# Patient Record
Sex: MALE | Race: BLACK OR AFRICAN AMERICAN | Employment: UNEMPLOYED
[De-identification: names, ages, dates, MRNs, and addresses within clinical notes are randomized per-mention and may not be internally consistent; named-entity substitution may affect disease eponyms.]

---
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
## 2021-02-19 ENCOUNTER — OFFICE VISIT (OUTPATIENT)
Dept: PULMONOLOGY | Age: 21
End: 2021-02-19
Payer: COMMERCIAL

## 2021-02-19 VITALS
HEART RATE: 80 BPM | SYSTOLIC BLOOD PRESSURE: 140 MMHG | WEIGHT: 315 LBS | OXYGEN SATURATION: 99 % | TEMPERATURE: 97.3 F | DIASTOLIC BLOOD PRESSURE: 79 MMHG | RESPIRATION RATE: 18 BRPM | BODY MASS INDEX: 50.62 KG/M2 | HEIGHT: 66 IN

## 2021-02-19 DIAGNOSIS — J45.20 MILD INTERMITTENT ASTHMA WITHOUT COMPLICATION: ICD-10-CM

## 2021-02-19 DIAGNOSIS — G47.419 NARCOLEPSY WITHOUT CATAPLEXY: ICD-10-CM

## 2021-02-19 DIAGNOSIS — J30.9 ALLERGIC RHINITIS, UNSPECIFIED SEASONALITY, UNSPECIFIED TRIGGER: ICD-10-CM

## 2021-02-19 DIAGNOSIS — G47.419 PRIMARY NARCOLEPSY WITHOUT CATAPLEXY: Primary | ICD-10-CM

## 2021-02-19 PROCEDURE — G8417 CALC BMI ABV UP PARAM F/U: HCPCS | Performed by: INTERNAL MEDICINE

## 2021-02-19 PROCEDURE — G8427 DOCREV CUR MEDS BY ELIG CLIN: HCPCS | Performed by: INTERNAL MEDICINE

## 2021-02-19 PROCEDURE — 99214 OFFICE O/P EST MOD 30 MIN: CPT | Performed by: INTERNAL MEDICINE

## 2021-02-19 PROCEDURE — 1036F TOBACCO NON-USER: CPT | Performed by: INTERNAL MEDICINE

## 2021-02-19 PROCEDURE — G8484 FLU IMMUNIZE NO ADMIN: HCPCS | Performed by: INTERNAL MEDICINE

## 2021-02-19 RX ORDER — MONTELUKAST SODIUM 10 MG/1
TABLET ORAL
Qty: 90 TABLET | Refills: 3 | Status: SHIPPED | OUTPATIENT
Start: 2021-02-19 | End: 2022-04-27 | Stop reason: SDUPTHER

## 2021-02-19 RX ORDER — SODIUM OXYBATE 0.5 G/ML
4.5 SOLUTION ORAL 2 TIMES DAILY
Qty: 1 BOTTLE | Refills: 5 | Status: SHIPPED | OUTPATIENT
Start: 2021-02-19 | End: 2021-02-21 | Stop reason: SDUPTHER

## 2021-02-19 ASSESSMENT — SLEEP AND FATIGUE QUESTIONNAIRES
ESS TOTAL SCORE: 8
HOW LIKELY ARE YOU TO NOD OFF OR FALL ASLEEP WHILE SITTING AND TALKING TO SOMEONE: 2
HOW LIKELY ARE YOU TO NOD OFF OR FALL ASLEEP WHILE WATCHING TV: 2
HOW LIKELY ARE YOU TO NOD OFF OR FALL ASLEEP WHILE SITTING AND READING: 0
HOW LIKELY ARE YOU TO NOD OFF OR FALL ASLEEP IN A CAR, WHILE STOPPED FOR A FEW MINUTES IN TRAFFIC: 0

## 2021-02-19 NOTE — PROGRESS NOTES
0.6, sleep efficiency index was 89.7%, sleep latency was 1 minute and REM latency was 2.5 minutes there were 3.'s of REM observed. Sleep study did show PLMD with index of around 32 and he was not taking Xyrem at that time. Sleep questionnaire on 11/06/2020  Snores at night, Does not wakes himself snoring. Has no witnessed apnea. Does not wakes up with choking and gasping sensation. Positive dry mouth upon awakening. Positive fatigue and tiredness during the day. No history of sleep apnea. Goes to sleep at 8- 9 pm, wakes up 6-7 am. It takes 5-10 minutes to fall asleep. Wakes up 0-1 times at night to go to bathroom. Takes 2 nap during the day ( 30 to 60 minutes). No headache in am. No car wrecks or near wrecks because of the sleepiness. Positive forgetfulness or decreased concentration. No nasal congestion or obstruction at night. No significant caffienated drinks or alcohol. No leg jerks during sleep. No restless feelings in legs at night. No numbness or burning in leg or feet. No leg aches or cramps . No loss of muscle strength when angry or laugh. No hallucination when dozing off or waking up from sleep. No paralysis upon awakening from sleep or going to sleep. No teeth grinding, occasional nightmares, no sleep walking. No night time panic attacks. Initial history and evaluation on 07/31/2010  He has been followed up by Dr. 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   Accompanied by his mother  Apparently he had narcolepsy for long time according to available history and according to mother he had cataplectic symptoms for short time long time ago and he had not had those symptoms for many many years now, he was on Nuvigil to 50 mg once a day he was still having symptoms with tiredness fatigue excessive daytime sleepiness falling asleep during the daytime multiple times and in last November he was started on Xyrem and supposed to take Xyrem 4.5 g twice at night. He did well since he was started on Xyrem he is more awake during the daytime able to function better but sometimes he does not take Xyrem twice at night and sometimes he forgets. He does feel tired and fatigued during the daytime he does go to sleep easy he take multiple naps during the daytime he dose of easy during the daytime. Since his goals are closed because of pandemic he goes to sleep around 11 PM and wake up 530 to 6:30 in the morning. He take 4 of 5 naps each 30 to 60 minutes during the daytime according to patient he take Xyrem first dose around 8 30-9 and 4 hours later he supposed to take Xyrem which she does not always take it these days. He denies any cataplectic symptoms, in the past many years ago he had sleep paralysis and hallucination but he does not have it for many years anymore. He does have snoring, sometimes witnessed apnea by mother, unrefreshed sleep, multiple awakening at night, decreased concentration during the daytime. According to mother he had sleep study done in 2013 and at that time he was told that he does not have sleep apnea since then he had gained considerable amount of weight. He had history of mild intermittent asthma he was supposed to be on Qvar in the past he had not taking Qvar for more than a year and does not take albuterol for more than a year he claimed that he get some time weather changes and summer season but he does not have asthma exacerbation or asthma symptoms for some time. He is supposed to take Singulair but he does not take Singulair for some time  He also have history of allergic rhinitis for nasal allergies he was prescribed Zyrtec lately he had not started Zyrtec yet.        Sleep Medicine 2/19/2021 11/6/2020 3/7/2018 3/7/2018   Sitting and reading 0 2 1 -   Watching TV 2 2 2 -   Sitting, inactive in a public place (e.g. a theatre or a meeting) 0 3 0 -   As a passenger in a car for an hour without a break 2 3 2 -   Lying down to rest in the afternoon when circumstances permit 0 0 2 -   Sitting and talking to someone 2 2 0 -   Sitting quietly after a lunch without alcohol 2 3 1 -   In a car, while stopped for a few minutes in traffic 0 3 2 -   Total score 8 18 10 -   Neck circumference - - 16 16           Past Medical History:        Diagnosis Date    Allergic rhinitis     Asthma     GERD (gastroesophageal reflux disease)     Obesity (BMI 30-39.9) 6/9/2020    Unspecified sleep apnea        Past Surgical History:        Procedure Laterality Date    HERNIA REPAIR      SINUS SURGERY  2009    TONSILLECTOMY AND ADENOIDECTOMY  2009    WISDOM TOOTH EXTRACTION  12/14/2018       Allergies: Allergies   Allergen Reactions    Eggs [Egg White]     Milk-Related Compounds     Other      mold    Penicillins          Home Meds:   Outpatient Encounter Medications as of 2/19/2021   Medication Sig Dispense Refill    albuterol sulfate HFA (PROAIR HFA) 108 (90 Base) MCG/ACT inhaler Inhale 2 puffs into the lungs every 6 hours as needed for Wheezing 1 Inhaler 5    montelukast (SINGULAIR) 10 MG tablet take 1 tablet every morning 90 tablet 1    Armodafinil 250 MG TABS   0    Respiratory Therapy Supplies (VORTEX HOLDING CHAMBER/MASK) GADIEL 1 Device by Does not apply route daily as needed. 1 Device 0    sodium oxybate (XYREM) 500 MG/ML SOLN solution Take 7.5 mLs by mouth 2 times daily for 30 days. 1 Bottle 1    beclomethasone (QVAR) 80 MCG/ACT inhaler Inhale 2 puffs into the lungs 2 times daily (Patient not taking: Reported on 7/31/2020) 1 Inhaler 5     No facility-administered encounter medications on file as of 2/19/2021. Social History:   TOBACCO:   reports that he has quit smoking. He has never used smokeless tobacco.  ETOH:   reports no history of alcohol use.   OCCUPATION:      Family History:       Problem Relation Age of Onset    Asthma Other        Immunizations: Immunization History   Administered Date(s) Administered    HPV Quadrivalent (Gardasil) 08/01/2013    Hepatitis A Ped/Adol (Havrix, Vaqta) 07/31/2012    Influenza 01/08/2013    Influenza Nasal 10/16/2014, 11/19/2015    Influenza Whole 10/29/2007, 01/19/2010    Influenza, Quadv, IM, PF (6 mo and older Fluzone, Flulaval, Fluarix, and 3 yrs and older Afluria) 01/10/2019, 10/31/2019    Meningococcal B, Recombinant [place]amm[place]) 03/06/2019    Meningococcal MCV4P (Menactra) 08/01/2013    Tdap (Boostrix, Adacel) 08/01/2013         REVIEW OF SYSTEMS:  CONSTITUTIONAL: Positive for fatigue negative for  fevers, chills, sweats, anorexia, and weight loss  EYES:  negative for  double vision, blurred vision, dry eyes, eye discharge, visual disturbance, redness, and icterus  HEENT:  negative for  hearing loss, tinnitus, ear drainage, earaches, nasal congestion, epistaxis, sore throat, hoarseness, voice change, and postnasal drip  RESPIRATORY:  negative for  dry cough, cough with sputum, dyspnea, wheezing, hemoptysis, chest pain, and pleuritic pain  CARDIOVASCULAR:  negative for  chest pain, dyspnea, palpitations, orthopnea, PND, exertional chest pressure/discomfort, fatigue, edema, syncope  GASTROINTESTINAL:  negative for nausea, vomiting, diarrhea, constipation, abdominal pain, abdominal mass, abdominal distention, jaundice, dysphagia, reflux, odynophagia, hematemesis, and hemtochezia  GENITOURINARY:  negative for frequency, dysuria, nocturia, and hematuria  HEMATOLOGIC/LYMPHATIC:  negative for easy bruising, bleeding, lymphadenopathy, and petechiae  ALLERGIC/IMMUNOLOGIC:  negative for recurrent infections, urticaria, hay fever, angioedema, anaphylaxis, and drug reactions  ENDOCRINE:  negative for heat intolerance, cold intolerance, tremor, and weight changes  MUSCULOSKELETAL:  negative for  myalgias, arthralgias, joint swelling, stiff joints, and muscle weakness  NEUROLOGICAL:  negative for headaches, dizziness, seizures, memory problems, speech problems, visual disturbance, gait problems, tremor, dysphagia, weakness, numbness, syncope, and tingling  BEHAVIOR/PSYCH:  negative for decreased sleep, decreased energy level, increased energy level, depressed mood, and anxiety          Physical Exam:    Vitals: BP (!) 140/79   Pulse 80   Temp 97.3 °F (36.3 °C)   Resp 18   Ht 5' 6\" (1.676 m)   Wt (!) 348 lb (157.9 kg)   SpO2 99%   BMI 56.17 kg/m²   Last 3 weights: Wt Readings from Last 3 Encounters:   02/19/21 (!) 348 lb (157.9 kg) (>99 %, Z= 3.49)*   11/06/20 (!) 348 lb (157.9 kg) (>99 %, Z= 3.47)*   08/30/20 (!) 341 lb (154.7 kg) (>99 %, Z= 3.40)*     * Growth percentiles are based on [facility] (Boys, 2-20 Years) data. Body mass index is 56.17 kg/m². Physical Examination:   General appearance - alert, well appearing, and in no distress, overweight and acyanotic, in no respiratory distress  Mental status - alert, oriented to person, place, and time, normal mood, behavior, speech, dress, motor activity, and thought processes  Eyes - pupils equal and reactive, extraocular eye movements intact, sclera anicteric  Ears - right ear normal, left ear normal  Nose - normal and patent, no erythema, discharge or polyps  Mouth - mucous membranes moist, pharynx normal without lesions and very large tongue, thick uvula, small oropharynx, Mallampati 3  Neck - supple, no significant adenopathy, very short and thick neck  Chest - clear to auscultation, no wheezes, rales or rhonchi, symmetric air entry, no tachypnea, retractions or cyanosis bilateral symmetrical chest movement, normal resonance on percussion, air entry is present bilaterally and symmetrical, no expiratory wheezing rhonchi or crackles.   Heart - normal rate, regular rhythm, normal S1, S2, no murmurs, rubs, clicks or gallops  Abdomen - soft, nontender, nondistended, no masses or organomegaly  Neurological - alert, oriented, normal speech, no focal findings or movement Ref Range Status   05/13/2020 15 5 - 41 U/L Final   03/09/2019 13 5 - 41 U/L Final   06/16/2015 16 5 - 41 U/L Final     Total Bilirubin   Date Value Ref Range Status   05/13/2020 0.49 0.3 - 1.2 mg/dL Final   03/09/2019 0.54 0.3 - 1.2 mg/dL Final   06/16/2015 0.64 0.3 - 1.2 mg/dL Final     Alkaline Phosphatase   Date Value Ref Range Status   05/13/2020 88 40 - 129 U/L Final   03/09/2019 100 52 - 171 U/L Final   06/16/2015 227 74 - 390 U/L Final     Amylase: No results found for: AMYLASE  Lipase: No results found for: LIPASE  CARDIAC ENZYMES: No results found for: CKTOTAL, CKMB, CKMBINDEX, TROPONINI  BNP: No results found for: BNP  Lipids:   Cholesterol   Date Value Ref Range Status   03/09/2019 160 <200 mg/dL Final     Comment:        Cholesterol Guidelines:      <200  Desirable   200-240  Borderline      >240  Undesirable          HDL   Date Value Ref Range Status   05/13/2020 41 >40 mg/dL Final     Comment:        HDL Guidelines:    <40     Undesirable   40-59    Borderline    >59     Desirable            INR: No results found for: INR  Thyroid:   TSH   Date Value Ref Range Status   05/13/2020 1.25 0.30 - 5.00 mIU/L Final     Urinalysis:   Color, UA   Date Value Ref Range Status   03/09/2019 YELLOW YELLOW Final     pH, UA   Date Value Ref Range Status   03/09/2019 6.5 5.0 - 8.0 Final     Protein, UA   Date Value Ref Range Status   03/09/2019 NEGATIVE NEGATIVE Final     Specific Gravity, UA   Date Value Ref Range Status   03/09/2019 1.016 1.005 - 1.030 Final     Bilirubin Urine   Date Value Ref Range Status   03/09/2019 NEGATIVE NEGATIVE Final     Nitrite, Urine   Date Value Ref Range Status   03/09/2019 NEGATIVE NEGATIVE Final     Leukocyte Esterase, Urine   Date Value Ref Range Status   03/09/2019 NEGATIVE NEGATIVE Final     Glucose, Ur   Date Value Ref Range Status   03/09/2019 NEGATIVE NEGATIVE Final     Cultures:-  -----------------------------------------------------------------    ABGs: No results found for: PHART, PO2ART, UND7FWT    Pulmonary Functions Testing Results:    No results found for: FEV1, FVC, WMN6KLX, TLC, DLCO    CXR      CT Scans    Sleep study 08/30/2020 08/30/2020 and did not show any significant sleep apnea with total AHI of 0.6, sleep efficiency index was 89.7%, sleep latency was 1 minute and REM latency was 2.5 minutes there were 3.'s of REM observed. Sleep study did show PLMD with index of around 32 and he was not taking Xyrem at that time. Assessment and Plan       ICD-10-CM   1. GALO (obstructive sleep apnea)  G47.33       2. Primary narcolepsy without cataplexy  G47.419   3. Morbid obesity with BMI of 45.0-49.9, adult [facility]  E66.01    Z68.42   4. Mild intermittent asthma without complication  N65.37   5. Allergic rhinitis, unspecified seasonality, unspecified trigger  J30.9         Plan and recommendation:    Continue Xyrem currently 3.375 g.  8 PM and 12  Continue Xyrem at 4.5 g at 8 PM and 12 midnight once he is able to reach to 4.5 g  Advised and warned about the effect of Xyrem during the daytime. Advised strictly about understanding the dose of Xyrem. Prescription for that and return  Discussed with patient and mother about nighttime consolidated sleep. Discussed with him about fixed sleeping time and waking up time. Consolidated nighttime sleep of 8 hours discussed and advised. Avoidance of long nap during the daytime but the naps of 30 minutes advised. Good sleep hygiene and sleep environment discussed with the patient.       Sleep study results which was done on 08/30/2020 seen  Wt loss is recommended and discussed  Follow good sleep hygeine instructions  Given sleep hygeine instructions    Singulair 10 mg once daily to continue prescription written  Albuterol as needed  Observe off Qvar as he is off for many years  Advised to use Claritin as needed    Vaccinations recommended annually in fall  Up to date with vaccinations from [facility] an active lifestyle

## 2021-02-21 RX ORDER — SODIUM OXYBATE 0.5 G/ML
4.5 SOLUTION ORAL 2 TIMES DAILY
Qty: 1 BOTTLE | Refills: 5 | Status: SHIPPED | OUTPATIENT
Start: 2021-02-21 | End: 2022-09-23 | Stop reason: ALTCHOICE

## 2021-02-22 NOTE — PATIENT INSTRUCTIONS
2/22/2021 - faxed over new xyrem script form for us to become patients new pulmonary doctor.  Also faxed over Prescriber enrollment form for Dr. Mignon Juarez.

## 2021-04-26 ENCOUNTER — TELEPHONE (OUTPATIENT)
Dept: PULMONOLOGY | Age: 21
End: 2021-04-26

## 2021-04-26 NOTE — TELEPHONE ENCOUNTER
RECEIVED a PA notice for Zyrem. THis was processed with covermymeds.      Kelly Magallon (Key: [id number])   Xyrem 500MG/ML solution   Form  [employer] Electronic Prior Authorization Form 2017 [employer]   Plan Contact  [phone] phone   Created   5 hours ago   Sent to Plan   less than a minute ago   Plan Response   less than a minute ago   Submit Clinical Questions   Determination   N/A   Message from Plan  Prior Authorization duplicate/approved

## 2021-05-21 ENCOUNTER — OFFICE VISIT (OUTPATIENT)
Dept: PULMONOLOGY | Age: 21
End: 2021-05-21
Payer: COMMERCIAL

## 2021-05-21 VITALS
HEART RATE: 88 BPM | BODY MASS INDEX: 50.62 KG/M2 | OXYGEN SATURATION: 98 % | DIASTOLIC BLOOD PRESSURE: 78 MMHG | TEMPERATURE: 97.1 F | WEIGHT: 315 LBS | SYSTOLIC BLOOD PRESSURE: 123 MMHG | HEIGHT: 66 IN

## 2021-05-21 DIAGNOSIS — J45.20 MILD INTERMITTENT ASTHMA WITHOUT COMPLICATION: ICD-10-CM

## 2021-05-21 DIAGNOSIS — G47.33 OSA (OBSTRUCTIVE SLEEP APNEA): ICD-10-CM

## 2021-05-21 DIAGNOSIS — G47.61 PERIODIC LIMB MOVEMENT DISORDER (PLMD): ICD-10-CM

## 2021-05-21 DIAGNOSIS — G47.419 NARCOLEPSY WITHOUT CATAPLEXY: Primary | ICD-10-CM

## 2021-05-21 DIAGNOSIS — E66.01 MORBID OBESITY WITH BMI OF 45.0-49.9, ADULT (HCC): ICD-10-CM

## 2021-05-21 PROCEDURE — G8417 CALC BMI ABV UP PARAM F/U: HCPCS | Performed by: INTERNAL MEDICINE

## 2021-05-21 PROCEDURE — 99214 OFFICE O/P EST MOD 30 MIN: CPT | Performed by: INTERNAL MEDICINE

## 2021-05-21 PROCEDURE — G8427 DOCREV CUR MEDS BY ELIG CLIN: HCPCS | Performed by: INTERNAL MEDICINE

## 2021-05-21 PROCEDURE — 1036F TOBACCO NON-USER: CPT | Performed by: INTERNAL MEDICINE

## 2021-05-21 ASSESSMENT — SLEEP AND FATIGUE QUESTIONNAIRES
HOW LIKELY ARE YOU TO NOD OFF OR FALL ASLEEP WHEN YOU ARE A PASSENGER IN A CAR FOR AN HOUR WITHOUT A BREAK: 3
HOW LIKELY ARE YOU TO NOD OFF OR FALL ASLEEP WHILE WATCHING TV: 2
HOW LIKELY ARE YOU TO NOD OFF OR FALL ASLEEP WHILE LYING DOWN TO REST IN THE AFTERNOON WHEN CIRCUMSTANCES PERMIT: 3
ESS TOTAL SCORE: 19

## 2021-05-21 NOTE — PROGRESS NOTES
OUTPATIENT PULMONARY PROGRESS NOTE      Patient:  Zafar Ochoa  MRN: [id number]    Consulting Physician: Anil Coppola MD  Reason for Consult: Narcolepsy without cataplexy/mild intermittent asthma  Primacy Care Physician: Anil Coppola MD    HISTORY OF PRESENT ILLNESS:   The patient is a 24 y.o. 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 He has history of noncompliance with Xyrem. When he was seen last time I had a long discussion in presence of mother and was restarted on Xyrem. According to patient and mother apparently he took Xyrem for some time and then stop it again for last 2 months he is not been taking Xyrem. According to patient he thinks that his sleeping habit is better. He graduated from school. According to patient when he is not active and he does not have to do anything he can sleep for 2 to 3 hours otherwise he take naps 15 to 20 minutes 2-3 times every day. He is going to sleep around 10 PM now and waking up around 5 to 6 in the morning. He does not complain of sleep paralysis, no hallucination during sleep or coming of sleep. No cataplexy. No significant restless leg symptoms or movement during sleep. He does not drive. He does not have any problem taking his abdomen denies any side effect from Xyrem but just not been compliant with Xyrem. He had some weight gain according to mother denies any weight loss. He does not have any asthma symptoms last time he took albuterol was long time ago not in any other medication. He has intermittent allergy rhinitis symptoms which is seasonal denies any symptoms currently. Denies shortness of breath cough chest tightness wheezing and no dyspnea on exertion.     Last sleep study was done on 08/30/2020 and did not show any significant sleep apnea with total AHI of 0.6, sleep efficiency index was 89.7%, sleep latency was 1 minute and REM latency was 2.5 minutes there were 3.'s of REM observed. Sleep study did show PLMD with index of around 32 and he was not taking Xyrem at that time. Sleep questionnaire on 05/21/2021  Snores at night, Does not wakes himself snoring. Has no witnessed apnea. Does not wakes up with choking and gasping sensation. Positive dry mouth upon awakening. Positive fatigue and tiredness during the day. No history of sleep apnea. Goes to sleep at 10 pm, wakes up 5-6 am. It takes 5-10 minutes to fall asleep. Wakes up 0-1 times at night to go to bathroom. Takes 2-3 nap during the day. No headache in am. No car wrecks or near wrecks because of the sleepiness. Positive forgetfulness or decreased concentration. No nasal congestion or obstruction at night. No significant caffienated drinks or alcohol. No leg jerks during sleep. No restless feelings in legs at night. No numbness or burning in leg or feet. No leg aches or cramps . No loss of muscle strength when angry or laugh. No hallucination when dozing off or waking up from sleep. No paralysis upon awakening from sleep or going to sleep. No teeth grinding, occasional nightmares, no sleep walking. No night time panic attacks. Initial history and evaluation on 07/31/2010  He has been followed up by Dr. Oscar Vega pediatric pulmonologist for a long time with history of narcolepsy without cataplexy, history of mild intermittent asthma, allergic rhinitis.   Accompanied by his mother  Apparently he had narcolepsy for long time according to available history and according to mother he had cataplectic symptoms for short time long time ago and he had not had those symptoms for many many years now, he was on Nuvigil to 50 mg once a day he was still having symptoms with tiredness fatigue excessive daytime sleepiness falling asleep during the daytime multiple times and in last November he was started on Xyrem and supposed to take Xyrem 4.5 g twice at night. He did well since he was started on Xyrem he is more awake during the daytime able to function better but sometimes he does not take Xyrem twice at night and sometimes he forgets. He does feel tired and fatigued during the daytime he does go to sleep easy he take multiple naps during the daytime he dose of easy during the daytime. Since his goals are closed because of pandemic he goes to sleep around 11 PM and wake up 530 to 6:30 in the morning. He take 4 of 5 naps each 30 to 60 minutes during the daytime according to patient he take Xyrem first dose around 8 30-9 and 4 hours later he supposed to take Xyrem which she does not always take it these days. He denies any cataplectic symptoms, in the past many years ago he had sleep paralysis and hallucination but he does not have it for many years anymore. He does have snoring, sometimes witnessed apnea by mother, unrefreshed sleep, multiple awakening at night, decreased concentration during the daytime. According to mother he had sleep study done in 2013 and at that time he was told that he does not have sleep apnea since then he had gained considerable amount of weight. He had history of mild intermittent asthma he was supposed to be on Qvar in the past he had not taking Qvar for more than a year and does not take albuterol for more than a year he claimed that he get some time weather changes and summer season but he does not have asthma exacerbation or asthma symptoms for some time. He is supposed to take Singulair but he does not take Singulair for some time  He also have history of allergic rhinitis for nasal allergies he was prescribed Zyrtec lately he had not started Zyrtec yet.        Sleep Medicine 5/21/2021 2/19/2021 11/6/2020 3/7/2018 3/7/2018   Sitting and reading 3 0 2 1 -   Watching TV 2 2 2 2 -   Sitting, inactive in a public place (e.g. a theatre or a meeting) 3 0 3 0 -   As a passenger in a car for an hour without a break 3 2 3 2 - Lying down to rest in the afternoon when circumstances permit 3 0 0 2 -   Sitting and talking to someone 2 2 2 0 -   Sitting quietly after a lunch without alcohol 3 2 3 1 -   In a car, while stopped for a few minutes in traffic 0 0 3 2 -   Total score 19 8 18 10 -   Neck circumference - - - 16 16           Past Medical History:        Diagnosis Date    Allergic rhinitis     Asthma     GERD (gastroesophageal reflux disease)     Obesity (BMI 30-39.9) 6/9/2020    Unspecified sleep apnea        Past Surgical History:        Procedure Laterality Date    HERNIA REPAIR      SINUS SURGERY  2009    TONSILLECTOMY AND ADENOIDECTOMY  2009    WISDOM TOOTH EXTRACTION  12/14/2018       Allergies: Allergies   Allergen Reactions    Eggs [Egg White]     Milk-Related Compounds     Other      mold    Penicillins          Home Meds:   Outpatient Encounter Medications as of 5/21/2021   Medication Sig Dispense Refill    metFORMIN (GLUCOPHAGE) 500 MG tablet Take 500 mg by mouth daily      sodium oxybate (XYREM) 500 MG/ML SOLN solution Take 9 mLs by mouth 2 times daily for 30 days. 1 Bottle 5    montelukast (SINGULAIR) 10 MG tablet take 1 tablet every morning 90 tablet 3    albuterol sulfate HFA (PROAIR HFA) 108 (90 Base) MCG/ACT inhaler Inhale 2 puffs into the lungs every 6 hours as needed for Wheezing 1 Inhaler 5    Respiratory Therapy Supplies (VORTEX HOLDING CHAMBER/MASK) GADIEL 1 Device by Does not apply route daily as needed. 1 Device 0    Armodafinil 250 MG TABS   0    [DISCONTINUED] beclomethasone (QVAR) 80 MCG/ACT inhaler Inhale 2 puffs into the lungs 2 times daily 1 Inhaler 5     No facility-administered encounter medications on file as of 5/21/2021. Social History:   TOBACCO:   reports that he has quit smoking. He has never used smokeless tobacco.  ETOH:   reports no history of alcohol use.   OCCUPATION:      Family History:       Problem Relation Age of Onset    Asthma Other        Immunizations: Immunization History   Administered Date(s) Administered    HPV Quadrivalent (Gardasil) 08/01/2013    Hepatitis A Ped/Adol (Havrix, Vaqta) 07/31/2012    Influenza 01/08/2013    Influenza Nasal 10/16/2014, 11/19/2015    Influenza Whole 10/29/2007, 01/19/2010    Influenza, Quadv, IM, PF (6 mo and older Fluzone, Flulaval, Fluarix, and 3 yrs and older Afluria) 01/10/2019, 10/31/2019    Meningococcal B, Recombinant Gali Sergeant) 03/06/2019    Meningococcal MCV4P (Menactra) 08/01/2013    Tdap (Boostrix, Adacel) 08/01/2013         REVIEW OF SYSTEMS:  CONSTITUTIONAL: Denies fatigue negative for  fevers, chills, sweats, anorexia, and weight loss  EYES:  negative for  double vision, blurred vision, dry eyes, eye discharge, visual disturbance, redness, and icterus  HEENT:  negative for  hearing loss, tinnitus, ear drainage, earaches, nasal congestion, epistaxis, sore throat, hoarseness, voice change, and postnasal drip  RESPIRATORY:  negative for  dry cough, cough with sputum, dyspnea, wheezing, hemoptysis, chest pain, and pleuritic pain  CARDIOVASCULAR:  negative for  chest pain, dyspnea, palpitations, orthopnea, PND, exertional chest pressure/discomfort, fatigue, edema, syncope  GASTROINTESTINAL:  negative for nausea, vomiting, diarrhea, constipation, abdominal pain, abdominal mass, abdominal distention, jaundice, dysphagia, reflux, odynophagia, hematemesis, and hemtochezia  GENITOURINARY:  negative for frequency, dysuria, nocturia, and hematuria  HEMATOLOGIC/LYMPHATIC:  negative for easy bruising, bleeding, lymphadenopathy, and petechiae  ALLERGIC/IMMUNOLOGIC:  negative for recurrent infections, urticaria, hay fever, angioedema, anaphylaxis, and drug reactions  ENDOCRINE:  negative for heat intolerance, cold intolerance, tremor, and weight changes  MUSCULOSKELETAL:  negative for  myalgias, arthralgias, joint swelling, stiff joints, and muscle weakness  NEUROLOGICAL:  negative for headaches, dizziness, seizures, memory problems, speech problems, visual disturbance, gait problems, tremor, dysphagia, weakness, numbness, syncope, and tingling  BEHAVIOR/PSYCH:  negative for decreased sleep, decreased energy level, increased energy level, depressed mood, and anxiety          Physical Exam:    Vitals: /78 (Site: Right Lower Arm, Position: Sitting, Cuff Size: Medium Adult)   Pulse 88   Temp 97.1 °F (36.2 °C) (Skin)   Ht 5' 6\" (1.676 m)   Wt (!) 358 lb (162.4 kg)   SpO2 98% Comment: room air at rest  BMI 57.78 kg/m²   Last 3 weights: Wt Readings from Last 3 Encounters:   05/21/21 (!) 358 lb (162.4 kg) (>99 %, Z= 3.60)*   02/19/21 (!) 356 lb (161.5 kg) (>99 %, Z= 3.56)*   11/06/20 (!) 348 lb (157.9 kg) (>99 %, Z= 3.47)*     * Growth percentiles are based on CDC (Boys, 2-20 Years) data. Body mass index is 57.78 kg/m². Physical Examination:   General appearance - alert, well appearing, and in no distress, overweight and acyanotic, in no respiratory distress  Mental status - alert, oriented to person, place, and time, normal mood, behavior, speech, dress, motor activity, and thought processes  Eyes - pupils equal and reactive, extraocular eye movements intact, sclera anicteric  Ears - right ear normal, left ear normal  Nose - normal and patent, no erythema, discharge or polyps  Mouth - mucous membranes moist, pharynx normal without lesions and very large tongue, thick uvula, small oropharynx, Mallampati 3  Neck - supple, no significant adenopathy, very short and thick neck  Chest - clear to auscultation, no wheezes, rales or rhonchi, symmetric air entry, no tachypnea, retractions or cyanosis bilateral symmetrical chest movement, normal resonance on percussion, air entry is present bilaterally and symmetrical, no expiratory wheezing rhonchi or crackles.   Heart - normal rate, regular rhythm, normal S1, S2, no murmurs, rubs, clicks or gallops  Abdomen - soft, nontender, nondistended, no masses or organomegaly  Neurological - alert, oriented, normal speech, no focal findings or movement disorder noted}  Extremities - peripheral pulses normal, no pedal edema, no clubbing or cyanosis  Skin - normal coloration and turgor, no rashes, no suspicious skin lesions noted       LABS:    CBC:   WBC   Date Value Ref Range Status   03/09/2019 7.8 4.5 - 13.5 k/uL Final   02/10/2017 7.8 4.5 - 13.5 k/uL Final     Hemoglobin   Date Value Ref Range Status   03/09/2019 11.9 (L) 13.0 - 17.0 g/dL Final   02/10/2017 12.6 (L) 13.5 - 17.5 g/dL Final     Platelets   Date Value Ref Range Status   03/09/2019 428 138 - 453 k/uL Final   02/10/2017 354 130 - 400 k/uL Final     BMP:   Sodium   Date Value Ref Range Status   05/13/2020 137 135 - 144 mmol/L Final   03/09/2019 138 135 - 144 mmol/L Final   06/16/2015 139 135 - 144 mmol/L Final     Potassium   Date Value Ref Range Status   05/13/2020 4.2 3.7 - 5.3 mmol/L Final   03/09/2019 4.4 3.6 - 4.9 mmol/L Final   06/16/2015 4.7 3.6 - 4.9 mmol/L Final     Chloride   Date Value Ref Range Status   05/13/2020 102 98 - 107 mmol/L Final   03/09/2019 100 98 - 107 mmol/L Final   06/16/2015 100 98 - 107 mmol/L Final     CO2   Date Value Ref Range Status   05/13/2020 24 20 - 31 mmol/L Final   03/09/2019 26 20 - 31 mmol/L Final   06/16/2015 26 20 - 31 mmol/L Final     BUN   Date Value Ref Range Status   05/13/2020 10 6 - 20 mg/dL Final   03/09/2019 8 5 - 18 mg/dL Final   06/16/2015 8 5 - 18 mg/dL Final     CREATININE   Date Value Ref Range Status   05/13/2020 0.77 0.70 - 1.20 mg/dL Final   03/09/2019 0.78 0.70 - 1.20 mg/dL Final   06/16/2015 0.68 0.57 - 0.87 mg/dL Final     Glucose   Date Value Ref Range Status   03/09/2019 92 60 - 100 mg/dL Final   02/10/2017 92 60 - 100 mg/dL Final     Comment:     Performed at [facility] - INPATIENT [address], [facility]   [phone]     06/16/2015 90 60 - 100 mg/dL Final     Hepatic:     Amylase: No results found for: AMYLASE  Lipase:  No results found for: LIPASE  CARDIAC ENZYMES: No results found for: CKTOTAL, CKMB, CKMBINDEX, TROPONINI  BNP: No results found for: BNP  Lipids:       INR: No results found for: INR  Thyroid:   TSH   Date Value Ref Range Status   05/13/2020 1.25 0.30 - 5.00 mIU/L Final     Urinalysis:     Cultures:-  -----------------------------------------------------------------    ABGs: No results found for: PHART, PO2ART, JYF1GVG    Pulmonary Functions Testing Results:    No results found for: FEV1, FVC, CBZ4UAE, TLC, DLCO    CXR      CT Scans    Sleep study 08/30/2020 08/30/2020 and did not show any significant sleep apnea with total AHI of 0.6, sleep efficiency index was 89.7%, sleep latency was 1 minute and REM latency was 2.5 minutes there were 3.'s of REM observed. Sleep study did show PLMD with index of around 32 and he was not taking Xyrem at that time. Assessment and Plan       ICD-10-CM   1. GALO (obstructive sleep apnea)  G47.33       2. Primary narcolepsy without cataplexy  G47.419   3. Morbid obesity with BMI of 45.0-49.9, adult ([facility]  E66.01    Z68.42   4. Mild intermittent asthma without complication  V02.45   5. Allergic rhinitis, unspecified seasonality, unspecified trigger  J30.9         Plan and recommendation:    I would long discussion with patient presence of mother discussed the effect of narcolepsy. Regular sleep-wake cycle sleep onset and sleep awakening time, avoidance of long naps during the daytime and recommend naps of less than 30-minute was during the daytime if needed. Restart Xyrem need to start 2.5 gram at 8 PM and 12 midnight and then increase to 3.375 g after 1 week and then increase to 4.5 g after 1 week. Xyrem to be taken at 8 PM and 12 midnight  Advised and warned about the effect of Xyrem during the daytime. Advised strictly and discussed about understanding the dose of Xyrem.   He had prescription and medication at home does not need new prescription at this time  Discussed with patient and mother about nighttime consolidated sleep. Discussed with him about fixed sleeping time and waking up time. Consolidated nighttime sleep of 8 hours discussed and advised. Avoidance of long nap during the daytime but the naps of 30 minutes advised. Good sleep hygiene and sleep environment discussed with the patient. Wt loss is recommended and discussed  Follow good sleep hygeine instructions  Given sleep hygeine instructions    Singulair 10 mg once daily to continue   Albuterol as needed  He is off Qvar for many years  Advised to use Claritin as needed    Vaccinations recommended annually in fall  Up to date with vaccinations from pulm perspective   Recommend COVID-19 vaccine  Maintain an active lifestyle   Questions answered to pt's/family's satisfaction  Questions answered pertaining to diagnosis and management explained importance of compliance with therapy       RTC 3 months      It was my pleasure to evaluate Aubrey Kwok today. Please call with questions. Please note that this chart was generated using voice recognition Dragon dictation software. Although every effort was made to ensure the accuracy of this automated transcription, some errors in transcription may have occurred. Delvis Lezama MD, MD             5/21/2021, 3:30 PM    Please note that this chart was generated using voice recognition Dragon dictation software. Although every effort was made to ensure the accuracy of this automated transcription, some errors in transcription may have occurred.

## 2021-07-01 ENCOUNTER — HOSPITAL ENCOUNTER (OUTPATIENT)
Age: 21
Setting detail: SPECIMEN
Discharge: HOME | End: 2021-07-01
Payer: COMMERCIAL

## 2021-07-01 LAB
ALBUMIN SERPL-MCNC: 4 G/DL (ref 3.5–5.2)
ALBUMIN/GLOBULIN RATIO: 1.3 (ref 1–2.5)
ALP BLD-CCNC: 96 U/L (ref 40–129)
ALT SERPL-CCNC: 14 U/L (ref 5–41)
ANION GAP SERPL CALCULATED.3IONS-SCNC: 11 MMOL/L (ref 9–17)
AST SERPL-CCNC: 17 U/L
BILIRUB SERPL-MCNC: 0.62 MG/DL (ref 0.3–1.2)
BUN BLDV-MCNC: 8 MG/DL (ref 6–20)
BUN/CREAT BLD: ABNORMAL (ref 9–20)
CALCIUM SERPL-MCNC: 8.8 MG/DL (ref 8.6–10.4)
CHLORIDE BLD-SCNC: 103 MMOL/L (ref 98–107)
CHOLESTEROL, FASTING: 178 MG/DL
CHOLESTEROL/HDL RATIO: 4.8
CO2: 26 MMOL/L (ref 20–31)
CREAT SERPL-MCNC: 0.65 MG/DL (ref 0.7–1.2)
ESTIMATED AVERAGE GLUCOSE: 85 MG/DL
GFR AFRICAN AMERICAN: ABNORMAL ML/MIN
GFR NON-AFRICAN AMERICAN: ABNORMAL ML/MIN
GFR SERPL CREATININE-BSD FRML MDRD: ABNORMAL ML/MIN/{1.73_M2}
GFR SERPL CREATININE-BSD FRML MDRD: ABNORMAL ML/MIN/{1.73_M2}
GLUCOSE BLD-MCNC: 98 MG/DL (ref 70–99)
HBA1C MFR BLD: 4.6 % (ref 4–6)
HDLC SERPL-MCNC: 37 MG/DL
INSULIN COMMENT: NORMAL
INSULIN REFERENCE RANGE:: NORMAL
INSULIN: 56.1 MU/L
LDL CHOLESTEROL: 114 MG/DL (ref 0–130)
POTASSIUM SERPL-SCNC: 4.3 MMOL/L (ref 3.7–5.3)
SODIUM BLD-SCNC: 140 MMOL/L (ref 135–144)
T3 TOTAL: 123 NG/DL (ref 60–181)
T4 TOTAL: 7.4 UG/DL (ref 4.5–10.9)
TOTAL PROTEIN: 7.2 G/DL (ref 6.4–8.3)
TRIGLYCERIDE, FASTING: 134 MG/DL
TSH SERPL DL<=0.05 MIU/L-ACNC: 1.11 MIU/L (ref 0.3–5)
VLDLC SERPL CALC-MCNC: ABNORMAL MG/DL (ref 1–30)

## 2021-10-22 ENCOUNTER — OFFICE VISIT (OUTPATIENT)
Dept: PULMONOLOGY | Age: 21
End: 2021-10-22
Payer: COMMERCIAL

## 2021-10-22 VITALS
HEART RATE: 84 BPM | BODY MASS INDEX: 50.62 KG/M2 | HEIGHT: 66 IN | SYSTOLIC BLOOD PRESSURE: 128 MMHG | WEIGHT: 315 LBS | TEMPERATURE: 97.3 F | OXYGEN SATURATION: 99 % | DIASTOLIC BLOOD PRESSURE: 82 MMHG

## 2021-10-22 DIAGNOSIS — G47.419 NARCOLEPSY WITHOUT CATAPLEXY: Primary | ICD-10-CM

## 2021-10-22 DIAGNOSIS — G47.61 PERIODIC LIMB MOVEMENT DISORDER (PLMD): ICD-10-CM

## 2021-10-22 DIAGNOSIS — E66.01 MORBID OBESITY WITH BMI OF 45.0-49.9, ADULT (HCC): ICD-10-CM

## 2021-10-22 DIAGNOSIS — J45.20 MILD INTERMITTENT ASTHMA WITHOUT COMPLICATION: ICD-10-CM

## 2021-10-22 PROCEDURE — G8417 CALC BMI ABV UP PARAM F/U: HCPCS | Performed by: INTERNAL MEDICINE

## 2021-10-22 PROCEDURE — G8484 FLU IMMUNIZE NO ADMIN: HCPCS | Performed by: INTERNAL MEDICINE

## 2021-10-22 PROCEDURE — G8427 DOCREV CUR MEDS BY ELIG CLIN: HCPCS | Performed by: INTERNAL MEDICINE

## 2021-10-22 PROCEDURE — 1036F TOBACCO NON-USER: CPT | Performed by: INTERNAL MEDICINE

## 2021-10-22 PROCEDURE — 99213 OFFICE O/P EST LOW 20 MIN: CPT | Performed by: INTERNAL MEDICINE

## 2021-10-22 ASSESSMENT — SLEEP AND FATIGUE QUESTIONNAIRES
HOW LIKELY ARE YOU TO NOD OFF OR FALL ASLEEP IN A CAR, WHILE STOPPED FOR A FEW MINUTES IN TRAFFIC: 1
HOW LIKELY ARE YOU TO NOD OFF OR FALL ASLEEP WHILE LYING DOWN TO REST IN THE AFTERNOON WHEN CIRCUMSTANCES PERMIT: 3
HOW LIKELY ARE YOU TO NOD OFF OR FALL ASLEEP WHILE SITTING AND READING: 3
HOW LIKELY ARE YOU TO NOD OFF OR FALL ASLEEP WHEN YOU ARE A PASSENGER IN A CAR FOR AN HOUR WITHOUT A BREAK: 2
HOW LIKELY ARE YOU TO NOD OFF OR FALL ASLEEP WHILE SITTING INACTIVE IN A PUBLIC PLACE: 1
ESS TOTAL SCORE: 14
HOW LIKELY ARE YOU TO NOD OFF OR FALL ASLEEP WHILE SITTING AND TALKING TO SOMEONE: 1
HOW LIKELY ARE YOU TO NOD OFF OR FALL ASLEEP WHILE SITTING QUIETLY AFTER LUNCH WITHOUT ALCOHOL: 2
HOW LIKELY ARE YOU TO NOD OFF OR FALL ASLEEP WHILE WATCHING TV: 1

## 2021-10-22 NOTE — PROGRESS NOTES
OUTPATIENT PULMONARY PROGRESS NOTE      Patient:  Suleiman Naranjo  MRN: [id number]    Consulting Physician: Didi Lei MD  Reason for Consult: Narcolepsy without cataplexy/mild intermittent asthma  Primacy Care Physician: Didi Lei MD    HISTORY OF PRESENT ILLNESS:   The patient is a 24 y.o. male for follow-up of narcolepsy without cataplexy, mild intermittent asthma, hypersomnia obesity and allergic rhinitis  When he was seen last time he was advised to restart Xyrem. He has history of noncompliance with Xyrem does not take except when he is restarted and taper of and he stopped using it after 2 weeks he did not use him since he was seen last time. According to patient he is more active he is exercising he is trying to lose weight and had lost 15 to 20 pound. He does not complain of that much sleepiness as he used to have he is able to be more awake and more energetic during the daytime able to do more denies irresistible sleep. He usually go to sleep around 10 PM and sleep until 8:52 AM in the morning. According to patient when he wake up in the morning he feels that he is doing better sleep is more refreshing. He still take nap of 1-1/2-hour during the daytime. He does not complain of shortness of breath he is able to do regular activities. He denies daily or nocturnal cough denies nocturnal awakening with wheezing chest tightness or shortness of breath. He does not complain of asthma exacerbation use albuterol as needed he had not use albuterol for last 1 to 2 years he is taking Singulair but not daily taking it off and on. He does not complain of nasal allergies or nasal obstruction at this time does not take any medication. Last sleep study was done on 08/30/2020 and did not show any significant sleep apnea with total AHI of 0.6, sleep efficiency index was 89.7%, sleep latency was 1 minute and REM latency was 2.5 minutes there were 3.'s of REM observed.   Sleep study did show PLMD with not take Xyrem twice at night and sometimes he forgets. He does feel tired and fatigued during the daytime he does go to sleep easy he take multiple naps during the daytime he dose of easy during the daytime. Since his goals are closed because of pandemic he goes to sleep around 11 PM and wake up 530 to 6:30 in the morning. He take 4 of 5 naps each 30 to 60 minutes during the daytime according to patient he take Xyrem first dose around 8 30-9 and 4 hours later he supposed to take Xyrem which she does not always take it these days. He denies any cataplectic symptoms, in the past many years ago he had sleep paralysis and hallucination but he does not have it for many years anymore. He does have snoring, sometimes witnessed apnea by mother, unrefreshed sleep, multiple awakening at night, decreased concentration during the daytime. According to mother he had sleep study done in 2013 and at that time he was told that he does not have sleep apnea since then he had gained considerable amount of weight. He had history of mild intermittent asthma he was supposed to be on Qvar in the past he had not taking Qvar for more than a year and does not take albuterol for more than a year he claimed that he get some time weather changes and summer season but he does not have asthma exacerbation or asthma symptoms for some time. He is supposed to take Singulair but he does not take Singulair for some time  He also have history of allergic rhinitis for nasal allergies he was prescribed Zyrtec lately he had not started Zyrtec yet.        Sleep Medicine 10/22/2021 5/21/2021 2/19/2021 11/6/2020 3/7/2018 3/7/2018   Sitting and reading 3 3 0 2 1 -   Watching TV 1 2 2 2 2 -   Sitting, inactive in a public place (e.g. a theatre or a meeting) 1 3 0 3 0 -   As a passenger in a car for an hour without a break 2 3 2 3 2 -   Lying down to rest in the afternoon when circumstances permit 3 3 0 0 2 -   Sitting and talking to someone 1 2 2 2 0 -   Sitting quietly after a lunch without alcohol 2 3 2 3 1 -   In a car, while stopped for a few minutes in traffic 1 0 0 3 2 -   Total score 14 19 8 18 10 -   Neck circumference - - - - 16 16           Past Medical History:        Diagnosis Date    Allergic rhinitis     Asthma     GERD (gastroesophageal reflux disease)     Obesity (BMI 30-39.9) 6/9/2020    Unspecified sleep apnea        Past Surgical History:        Procedure Laterality Date    HERNIA REPAIR      SINUS SURGERY  2009    TONSILLECTOMY AND ADENOIDECTOMY  2009    WISDOM TOOTH EXTRACTION  12/14/2018       Allergies: Allergies   Allergen Reactions    Eggs [Egg White]     Milk-Related Compounds     Other      mold    Penicillins          Home Meds:   Outpatient Encounter Medications as of 10/22/2021   Medication Sig Dispense Refill    metFORMIN (GLUCOPHAGE) 500 MG tablet Take 500 mg by mouth daily (Patient not taking: Reported on 10/22/2021)      sodium oxybate (XYREM) 500 MG/ML SOLN solution Take 9 mLs by mouth 2 times daily for 30 days. (Patient not taking: Reported on 10/22/2021) 1 Bottle 5    montelukast (SINGULAIR) 10 MG tablet take 1 tablet every morning (Patient not taking: Reported on 10/22/2021) 90 tablet 3    albuterol sulfate HFA (PROAIR HFA) 108 (90 Base) MCG/ACT inhaler Inhale 2 puffs into the lungs every 6 hours as needed for Wheezing (Patient not taking: Reported on 10/22/2021) 1 Inhaler 5    Armodafinil 250 MG TABS  (Patient not taking: Reported on 10/22/2021)  0    Respiratory Therapy Supplies (VORTEX HOLDING CHAMBER/MASK) GADIEL 1 Device by Does not apply route daily as needed. (Patient not taking: Reported on 10/22/2021) 1 Device 0     No facility-administered encounter medications on file as of 10/22/2021. Social History:   TOBACCO:   reports that he has quit smoking. He has never used smokeless tobacco.  ETOH:   reports no history of alcohol use.   OCCUPATION:      Family History:       Problem Relation Age of Onset    Asthma Other        Immunizations:    Immunization History   Administered Date(s) Administered    HPV Quadrivalent (Gardasil) 08/01/2013    Hepatitis A Ped/Adol (Havrix, Vaqta) 07/31/2012    Influenza 01/08/2013    Influenza Nasal 10/16/2014, 11/19/2015    Influenza Whole 10/29/2007, 01/19/2010    Influenza, Quadv, IM, PF (6 mo and older Fluzone, Flulaval, Fluarix, and 3 yrs and older Afluria) 01/10/2019, 10/31/2019    Meningococcal B, Recombinant Eldonna Jose) 03/06/2019    Meningococcal MCV4P (Menactra) 08/01/2013    Tdap (Boostrix, Adacel) 08/01/2013         REVIEW OF SYSTEMS:  CONSTITUTIONAL: Denies fatigue negative for  fevers, chills, sweats, and weight loss  EYES:  negative for  double vision, blurred vision, dry eyes, eye discharge, visual disturbance, redness, and icterus  HEENT:  negative for  hearing loss, tinnitus, ear drainage, earaches, nasal congestion, epistaxis, sore throat, hoarseness, voice change, and postnasal drip  RESPIRATORY:  negative for  dry cough, cough with sputum, dyspnea, wheezing, hemoptysis, chest pain, and pleuritic pain  CARDIOVASCULAR:  negative for  chest pain, dyspnea, palpitations, orthopnea, PND, exertional chest pressure/discomfort, fatigue, edema, syncope  GASTROINTESTINAL:  negative for nausea, vomiting, diarrhea, constipation, abdominal pain, abdominal mass, abdominal distention, jaundice, dysphagia, reflux, odynophagia, hematemesis, and hemtochezia  GENITOURINARY:  negative for frequency, dysuria, nocturia, and hematuria  HEMATOLOGIC/LYMPHATIC:  negative for easy bruising, bleeding, lymphadenopathy, and petechiae  ALLERGIC/IMMUNOLOGIC:  negative for recurrent infections, urticaria, hay fever, angioedema, anaphylaxis, and drug reactions  ENDOCRINE:  negative for heat intolerance, cold intolerance, tremor, and weight changes  MUSCULOSKELETAL:  negative for  myalgias, arthralgias, joint swelling, stiff joints, and muscle weakness  NEUROLOGICAL:  negative for headaches, dizziness, seizures, memory problems, speech problems, visual disturbance, gait problems, tremor, dysphagia, weakness, numbness, syncope, and tingling  BEHAVIOR/PSYCH:  negative for decreased sleep, decreased energy level, increased energy level, depressed mood, and anxiety          Physical Exam:    Vitals: /82 (Site: Right Lower Arm, Position: Sitting, Cuff Size: Medium Adult)   Pulse 84   Temp 97.3 °F (36.3 °C) (Temporal)   Ht 5' 6\" (1.676 m)   Wt (!) 339 lb (153.8 kg)   SpO2 99% Comment: room air at rest  BMI 54.72 kg/m²   Last 3 weights: Wt Readings from Last 3 Encounters:   10/22/21 (!) 339 lb (153.8 kg) (>99 %, Z= 3.45)*   05/21/21 (!) 358 lb (162.4 kg) (>99 %, Z= 3.60)*   02/19/21 (!) 356 lb (161.5 kg) (>99 %, Z= 3.56)*     * Growth percentiles are based on CDC (Boys, 2-20 Years) data. Body mass index is 54.72 kg/m². Physical Examination:   General appearance - alert, well appearing, and in no distress, overweight and acyanotic, in no respiratory distress  Mental status - alert, oriented to person, place, and time, normal mood, behavior, speech, dress, motor activity, and thought processes  Eyes - pupils equal and reactive, extraocular eye movements intact, sclera anicteric  Ears - right ear normal, left ear normal  Nose - normal and patent, no erythema, discharge or polyps  Mouth - mucous membranes moist, pharynx normal without lesions and very large tongue, thick uvula, small oropharynx, Mallampati 3  Neck - supple, no significant adenopathy, very short and thick neck  Chest - clear to auscultation, no wheezes, rales or rhonchi, symmetric air entry, no tachypnea, retractions or cyanosis bilateral symmetrical chest movement, normal resonance on percussion, air entry is present bilaterally and symmetrical, no expiratory wheezing rhonchi or crackles.   Heart - normal rate, regular rhythm, normal S1, S2, no murmurs, rubs, clicks or gallops  Abdomen - soft, nontender, nondistended, no masses or organomegaly  Neurological - alert, oriented, normal speech, no focal findings or movement disorder noted}  Extremities - peripheral pulses normal, no pedal edema, no clubbing or cyanosis  Skin - normal coloration and turgor, no rashes, no suspicious skin lesions noted       LABS:    CBC:   WBC   Date Value Ref Range Status   03/09/2019 7.8 4.5 - 13.5 k/uL Final   02/10/2017 7.8 4.5 - 13.5 k/uL Final     Hemoglobin   Date Value Ref Range Status   03/09/2019 11.9 (L) 13.0 - 17.0 g/dL Final   02/10/2017 12.6 (L) 13.5 - 17.5 g/dL Final     Platelets   Date Value Ref Range Status   03/09/2019 428 138 - 453 k/uL Final   02/10/2017 354 130 - 400 k/uL Final     BMP:   Sodium   Date Value Ref Range Status   07/01/2021 140 135 - 144 mmol/L Final   05/13/2020 137 135 - 144 mmol/L Final   03/09/2019 138 135 - 144 mmol/L Final     Potassium   Date Value Ref Range Status   07/01/2021 4.3 3.7 - 5.3 mmol/L Final   05/13/2020 4.2 3.7 - 5.3 mmol/L Final   03/09/2019 4.4 3.6 - 4.9 mmol/L Final     Chloride   Date Value Ref Range Status   07/01/2021 103 98 - 107 mmol/L Final   05/13/2020 102 98 - 107 mmol/L Final   03/09/2019 100 98 - 107 mmol/L Final     CO2   Date Value Ref Range Status   07/01/2021 26 20 - 31 mmol/L Final   05/13/2020 24 20 - 31 mmol/L Final   03/09/2019 26 20 - 31 mmol/L Final     BUN   Date Value Ref Range Status   07/01/2021 8 6 - 20 mg/dL Final   05/13/2020 10 6 - 20 mg/dL Final   03/09/2019 8 5 - 18 mg/dL Final     CREATININE   Date Value Ref Range Status   07/01/2021 0.65 (L) 0.70 - 1.20 mg/dL Final   05/13/2020 0.77 0.70 - 1.20 mg/dL Final   03/09/2019 0.78 0.70 - 1.20 mg/dL Final     Glucose   Date Value Ref Range Status   07/01/2021 98 70 - 99 mg/dL Final   03/09/2019 92 60 - 100 mg/dL Final   02/10/2017 92 60 - 100 mg/dL Final     Comment:     Performed at [address] [place] [facility], [address] [place] [address]   [phone]       Hepatic:     Amylase: No results found for: AMYLASE  Lipase: No results found for: LIPASE  CARDIAC ENZYMES: No results found for: CKTOTAL, CKMB, CKMBINDEX, TROPONINI  BNP: No results found for: BNP  Lipids:       INR: No results found for: INR  Thyroid:   TSH   Date Value Ref Range Status   07/01/2021 1.11 0.30 - 5.00 mIU/L Final     Urinalysis:     Cultures:-  -----------------------------------------------------------------    ABGs: No results found for: PHART, PO2ART, [id number]    Pulmonary Functions Testing Results:    No results found for: FEV1, FVC, AQE6HFU, TLC, DLCO    CXR      CT Scans    Sleep study 08/30/2020 08/30/2020 and did not show any significant sleep apnea with total AHI of 0.6, sleep efficiency index was 89.7%, sleep latency was 1 minute and REM latency was 2.5 minutes there were 3.'s of REM observed. Sleep study did show PLMD with index of around 32 and he was not taking Xyrem at that time. Assessment and Plan       ICD-10-CM   1. GALO (obstructive sleep apnea)  G47.33       2. Primary narcolepsy without cataplexy  G47.419   3. Morbid obesity with BMI of 45.0-49.9, adult ([facility]  E66.01    Z68.42   4. Mild intermittent asthma without complication  D46.18   5. Allergic rhinitis, unspecified seasonality, unspecified trigger  J30.9         Plan and recommendation:    I would long discussion with patient presence of mother discussed the effect of narcolepsy. Regular sleep-wake cycle sleep onset and sleep awakening time, avoidance of long naps during the daytime and recommend naps of less than 30-minute was during the daytime if needed. History of noncompliance with Xyrem he was restarted on multiple times in the past but unfortunately he did not take on a regular basis except for few weeks and then he stopped he is observed for some time now he claimed that he is doing better he is less sleepy more energetic during the daytime he is more active he still sleep for 11 to 12 hours to 24 hours.     Discussed with patient and mother about nighttime consolidated sleep. Discussed with him about fixed sleeping time and waking up time. Consolidated nighttime sleep of 8 hours discussed and advised. Avoidance of long nap during the daytime but the naps of 30 minutes advised. Good sleep hygiene and sleep environment discussed with the patient. Wt loss is recommended and discussed  Follow good sleep hygeine instructions  Given sleep hygeine instructions    Singulair 10 mg once daily to continue   Albuterol as needed  He is off Qvar for many years  Advised to use Claritin as needed    Vaccinations recommended annually in fall  Up to date with vaccinations from pulm perspective   Recommend COVID-19 vaccine. He does not want Covid vaccination  Maintain an active lifestyle   Questions answered to pt's/family's satisfaction  Questions answered pertaining to diagnosis and management explained importance of compliance with therapy       RTC 6 months      It was my pleasure to evaluate Debbie Cheng today. Please call with questions. Please note that this chart was generated using voice recognition Dragon dictation software. Although every effort was made to ensure the accuracy of this automated transcription, some errors in transcription may have occurred. Fabiola Turner MD, MD             10/22/2021, 4:13 PM    Please note that this chart was generated using voice recognition Dragon dictation software. Although every effort was made to ensure the accuracy of this automated transcription, some errors in transcription may have occurred.

## 2022-04-27 ENCOUNTER — OFFICE VISIT (OUTPATIENT)
Dept: PULMONOLOGY | Age: 22
End: 2022-04-27
Payer: COMMERCIAL

## 2022-04-27 VITALS
HEART RATE: 83 BPM | WEIGHT: 315 LBS | BODY MASS INDEX: 56.81 KG/M2 | RESPIRATION RATE: 19 BRPM | DIASTOLIC BLOOD PRESSURE: 78 MMHG | SYSTOLIC BLOOD PRESSURE: 136 MMHG | OXYGEN SATURATION: 97 %

## 2022-04-27 DIAGNOSIS — G47.61 PERIODIC LIMB MOVEMENT DISORDER (PLMD): ICD-10-CM

## 2022-04-27 DIAGNOSIS — J30.9 ALLERGIC RHINITIS, UNSPECIFIED SEASONALITY, UNSPECIFIED TRIGGER: ICD-10-CM

## 2022-04-27 DIAGNOSIS — G47.419 NARCOLEPSY WITHOUT CATAPLEXY: Primary | ICD-10-CM

## 2022-04-27 DIAGNOSIS — J45.20 MILD INTERMITTENT ASTHMA WITHOUT COMPLICATION: ICD-10-CM

## 2022-04-27 DIAGNOSIS — E66.01 MORBID OBESITY WITH BMI OF 45.0-49.9, ADULT (HCC): ICD-10-CM

## 2022-04-27 PROCEDURE — G8427 DOCREV CUR MEDS BY ELIG CLIN: HCPCS | Performed by: INTERNAL MEDICINE

## 2022-04-27 PROCEDURE — G8417 CALC BMI ABV UP PARAM F/U: HCPCS | Performed by: INTERNAL MEDICINE

## 2022-04-27 PROCEDURE — 1036F TOBACCO NON-USER: CPT | Performed by: INTERNAL MEDICINE

## 2022-04-27 PROCEDURE — 99213 OFFICE O/P EST LOW 20 MIN: CPT | Performed by: INTERNAL MEDICINE

## 2022-04-27 RX ORDER — ALBUTEROL SULFATE 90 UG/1
2 AEROSOL, METERED RESPIRATORY (INHALATION) EVERY 6 HOURS PRN
Qty: 1 EACH | Refills: 11 | Status: SHIPPED | OUTPATIENT
Start: 2022-04-27

## 2022-04-27 RX ORDER — MONTELUKAST SODIUM 10 MG/1
TABLET ORAL
Qty: 30 TABLET | Refills: 11 | Status: SHIPPED | OUTPATIENT
Start: 2022-04-27

## 2022-04-27 ASSESSMENT — SLEEP AND FATIGUE QUESTIONNAIRES
HOW LIKELY ARE YOU TO NOD OFF OR FALL ASLEEP WHILE SITTING AND TALKING TO SOMEONE: 0
ESS TOTAL SCORE: 12
HOW LIKELY ARE YOU TO NOD OFF OR FALL ASLEEP WHILE SITTING QUIETLY AFTER LUNCH WITHOUT ALCOHOL: 1
HOW LIKELY ARE YOU TO NOD OFF OR FALL ASLEEP WHILE SITTING AND READING: 3
HOW LIKELY ARE YOU TO NOD OFF OR FALL ASLEEP WHILE SITTING INACTIVE IN A PUBLIC PLACE: 1
HOW LIKELY ARE YOU TO NOD OFF OR FALL ASLEEP WHILE LYING DOWN TO REST IN THE AFTERNOON WHEN CIRCUMSTANCES PERMIT: 2
HOW LIKELY ARE YOU TO NOD OFF OR FALL ASLEEP WHILE WATCHING TV: 2
HOW LIKELY ARE YOU TO NOD OFF OR FALL ASLEEP IN A CAR, WHILE STOPPED FOR A FEW MINUTES IN TRAFFIC: 1
HOW LIKELY ARE YOU TO NOD OFF OR FALL ASLEEP WHEN YOU ARE A PASSENGER IN A CAR FOR AN HOUR WITHOUT A BREAK: 2

## 2022-04-27 NOTE — LETTER
[address] [facility] [address]  Phone: [phone]  Fax: [phone]    Eboni Horner MD    April 27, 2022     Jerri Danielson MD  [facility] [address]    Patient: Mayme Babinski   MR Number: [id number]   YOB: 2000   Date of Visit: 4/27/2022       Dear Jerri Danielson:    Thank you for referring Saundra Ulloa to me for evaluation/treatment. Below are the relevant portions of my assessment and plan of care. If you have questions, please do not hesitate to call me. I look forward to following Tova Archibald along with you.     Sincerely,      Eboni Horner MD

## 2022-04-27 NOTE — PROGRESS NOTES
OUTPATIENT PULMONARY PROGRESS NOTE      Patient:  Denise Tan  MRN: [id number]    Consulting Physician: Army Leslye MD  Reason for Consult: Narcolepsy without cataplexy/mild intermittent asthma  Primacy Care Physician: Army Leslye MD    HISTORY OF PRESENT ILLNESS:   The patient is a 22 y.o. 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 He had history of noncompliance with Xyrem in I had multiple discussion with him about the medication he had restarted for a week or 2 and then would stop it after few weeks never was compliant with Xarelto and ultimately stopped using it completely. According to patient he is more organized in his sleep he is sleeping from his time and take nap for his time when he is not taking naps and not sleeping he is more active and more energetic. According to patient he is working in [employer] for 5 hours or so. He used to go to sleep around 10 30-11 PM and wake up between 9 AM to 10 AM he will be awake until 1 or 2 and then he will take a nap which could be and usually between 2 to 3 hours and when he has to go to work he usually wake up around 4-5 and goes to work. He does not complain of any cataplectic symptoms denies any sleep paralysis no sleep hallucination. Beside taking his naps for 2 to 3 hours he denies sleepiness. He does have postnasal drip intermittently and allergic rhinitis nasal congestion currently he is not using Flonase. According to patient his asthma symptoms are controlled he did not use albuterol for some time but sometimes when he has allergy symptoms he feel symptoms he denies daily cough wheezing shortness of breath, nocturnal awakening with cough wheezing chest tightness or shortness of breath.   Currently patient is trying to be more active trying to lose weight he think that he has lost some weight since he was seen last time. Last sleep study was done on 08/30/2020 and did not show any significant sleep apnea with total AHI of 0.6, sleep efficiency index was 89.7%, sleep latency was 1 minute and REM latency was 2.5 minutes there were 3.'s of REM observed. Sleep study did show PLMD with index of around 32 and he was not taking Xyrem at that time. Sleep questionnaire on 04/27/2022  Snores at night, Does not wakes himself snoring. Has no witnessed apnea. Does not wakes up with choking and gasping sensation. Positive dry mouth upon awakening. Positive fatigue and tiredness during the day. No history of sleep apnea. Goes to sleep at 10 30 to 11 pm, wakes up 9-10 am. It takes 10- 15 minutes to fall asleep. Wakes up 0-1 times at night to go to bathroom. Takes 1 nap during the day. No headache in am. Does not drive. Positive forgetfulness or decreased concentration. No nasal congestion or obstruction at night. No significant caffienated drinks or alcohol. No leg jerks during sleep. No restless feelings in legs at night. No numbness or burning in leg or feet. No leg aches or cramps . No loss of muscle strength when angry or laugh. No hallucination when dozing off or waking up from sleep. No paralysis upon awakening from sleep or going to sleep. No teeth grinding, occasional nightmares, no sleep walking. No night time panic attacks. Initial history and evaluation on 07/31/2010  He has been followed up by Dr. Simon Fuentes pediatric pulmonologist for a long time with history of narcolepsy without cataplexy, history of mild intermittent asthma, allergic rhinitis.   Accompanied by his mother  Apparently he had narcolepsy for long time according to available history and according to mother he had cataplectic symptoms for short time long time ago and he had not had those symptoms for many many years now, he was on Nuvigil to 50 mg once a day he was still having symptoms with tiredness fatigue excessive daytime sleepiness falling asleep during the daytime multiple times and in last November he was started on Xyrem and supposed to take Xyrem 4.5 g twice at night. He did well since he was started on Xyrem he is more awake during the daytime able to function better but sometimes he does not take Xyrem twice at night and sometimes he forgets. He does feel tired and fatigued during the daytime he does go to sleep easy he take multiple naps during the daytime he dose of easy during the daytime. Since his goals are closed because of pandemic he goes to sleep around 11 PM and wake up 530 to 6:30 in the morning. He take 4 of 5 naps each 30 to 60 minutes during the daytime according to patient he take Xyrem first dose around 8 30-9 and 4 hours later he supposed to take Xyrem which she does not always take it these days. He denies any cataplectic symptoms, in the past many years ago he had sleep paralysis and hallucination but he does not have it for many years anymore. He does have snoring, sometimes witnessed apnea by mother, unrefreshed sleep, multiple awakening at night, decreased concentration during the daytime. According to mother he had sleep study done in 2013 and at that time he was told that he does not have sleep apnea since then he had gained considerable amount of weight. He had history of mild intermittent asthma he was supposed to be on Qvar in the past he had not taking Qvar for more than a year and does not take albuterol for more than a year he claimed that he get some time weather changes and summer season but he does not have asthma exacerbation or asthma symptoms for some time. He is supposed to take Singulair but he does not take Singulair for some time  He also have history of allergic rhinitis for nasal allergies he was prescribed Zyrtec lately he had not started Zyrtec yet.        Sleep Medicine 4/27/2022 10/22/2021 5/21/2021 2/19/2021 11/6/2020 3/7/2018 3/7/2018   Sitting and reading 3 3 3 0 2 1 -   Watching TV 2 1 2 2 2 2 -   Sitting, inactive in a public place (e.g. a theatre or a meeting) 1 1 3 0 3 0 -   As a passenger in a car for an hour without a break 2 2 3 2 3 2 -   Lying down to rest in the afternoon when circumstances permit 2 3 3 0 0 2 -   Sitting and talking to someone 0 1 2 2 2 0 -   Sitting quietly after a lunch without alcohol 1 2 3 2 3 1 -   In a car, while stopped for a few minutes in traffic 1 1 0 0 3 2 -   Total score 12 14 19 8 18 10 -   Neck circumference (Inches) - - - - - 16 16           Past Medical History:        Diagnosis Date    Allergic rhinitis     Asthma     GERD (gastroesophageal reflux disease)     Obesity (BMI 30-39.9) 6/9/2020    Unspecified sleep apnea        Past Surgical History:        Procedure Laterality Date    HERNIA REPAIR      SINUS SURGERY  2009    TONSILLECTOMY AND ADENOIDECTOMY  2009    WISDOM TOOTH EXTRACTION  12/14/2018       Allergies: Allergies   Allergen Reactions    Eggs [Egg White]     Milk-Related Compounds     Other      mold    Penicillins          Home Meds:   Outpatient Encounter Medications as of 4/27/2022   Medication Sig Dispense Refill    metFORMIN (GLUCOPHAGE) 500 MG tablet Take 500 mg by mouth daily (Patient not taking: Reported on 10/22/2021)      sodium oxybate (XYREM) 500 MG/ML SOLN solution Take 9 mLs by mouth 2 times daily for 30 days. (Patient not taking: Reported on 10/22/2021) 1 Bottle 5    montelukast (SINGULAIR) 10 MG tablet take 1 tablet every morning (Patient not taking: Reported on 10/22/2021) 90 tablet 3    albuterol sulfate HFA (PROAIR HFA) 108 (90 Base) MCG/ACT inhaler Inhale 2 puffs into the lungs every 6 hours as needed for Wheezing (Patient not taking: Reported on 10/22/2021) 1 Inhaler 5    Armodafinil 250 MG TABS  (Patient not taking: Reported on 10/22/2021)  0    Respiratory Therapy Supplies (VORTEX HOLDING CHAMBER/MASK) GADIEL 1 Device by Does not apply route daily as needed.  (Patient not taking: Reported on 10/22/2021) 1 Device 0     No facility-administered encounter medications on file as of 4/27/2022. Social History:   TOBACCO:   reports that he has never smoked. He has never used smokeless tobacco.  ETOH:   reports no history of alcohol use.   OCCUPATION:      Family History:       Problem Relation Age of Onset    Asthma Other        Immunizations:    Immunization History   Administered Date(s) Administered    HPV Quadrivalent (Gardasil) 08/01/2013    Hepatitis A Ped/Adol (Havrix, Vaqta) 07/31/2012    Influenza 01/08/2013    Influenza Nasal 10/16/2014, 11/19/2015    Influenza Whole 10/29/2007, 01/19/2010    Influenza, Quadv, IM, PF (6 mo and older Fluzone, Flulaval, Fluarix, and 3 yrs and older Afluria) 01/10/2019, 10/31/2019    Meningococcal B, Recombinant [facility]) 03/06/2019    Meningococcal MCV4P (Menactra) 08/01/2013    Tdap (Boostrix, Adacel) 08/01/2013         REVIEW OF SYSTEMS:  CONSTITUTIONAL: Denies fatigue negative for  fevers, chills, sweats, and weight loss  EYES:  negative for  double vision, blurred vision, dry eyes, eye discharge, visual disturbance, redness, and icterus  HEENT:  negative for  hearing loss, tinnitus, ear drainage, earaches, nasal congestion, epistaxis, sore throat, hoarseness, voice change, and postnasal drip  RESPIRATORY:  negative for  dry cough, cough with sputum, dyspnea, wheezing, hemoptysis, chest pain, and pleuritic pain  CARDIOVASCULAR:  negative for  chest pain, dyspnea, palpitations, orthopnea, PND, exertional chest pressure/discomfort, fatigue, edema, syncope  GASTROINTESTINAL:  negative for nausea, vomiting, diarrhea, constipation, abdominal pain, abdominal mass, abdominal distention, jaundice, dysphagia, reflux, odynophagia, hematemesis, and hemtochezia  GENITOURINARY:  negative for frequency, dysuria, nocturia, and hematuria  HEMATOLOGIC/LYMPHATIC:  negative for easy bruising, bleeding, lymphadenopathy, and petechiae  ALLERGIC/IMMUNOLOGIC: negative for recurrent infections, urticaria, hay fever, angioedema, anaphylaxis, and drug reactions  ENDOCRINE:  negative for heat intolerance, cold intolerance, tremor, and weight changes  MUSCULOSKELETAL:  negative for  myalgias, arthralgias, joint swelling, stiff joints, and muscle weakness  NEUROLOGICAL:  negative for headaches, dizziness, seizures, memory problems, speech problems, visual disturbance, gait problems, tremor, dysphagia, weakness, numbness, syncope, and tingling  BEHAVIOR/PSYCH:  negative for decreased sleep, decreased energy level, increased energy level, depressed mood, and anxiety          Physical Exam:    Vitals: /78 (Site: Left Lower Arm, Position: Sitting, Cuff Size: Large Adult)   Pulse 83   Resp 19   Wt (!) 352 lb (159.7 kg)   SpO2 97%   BMI 56.81 kg/m²   Last 3 weights: Wt Readings from Last 3 Encounters:   04/27/22 (!) 352 lb (159.7 kg)   10/22/21 (!) 339 lb (153.8 kg) (>99 %, Z= 3.45)*   05/21/21 (!) 358 lb (162.4 kg) (>99 %, Z= 3.60)*     * Growth percentiles are based on CDC (Boys, 2-20 Years) data. Body mass index is 56.81 kg/m².     Physical Examination:   General appearance - alert, well appearing, and in no distress, overweight and acyanotic, in no respiratory distress  Mental status - alert, oriented to person, place, and time, normal mood, behavior, speech, dress, motor activity, and thought processes  Eyes - pupils equal and reactive, extraocular eye movements intact, sclera anicteric  Ears - right ear normal, left ear normal  Nose - normal and patent, no erythema, discharge or polyps  Mouth - mucous membranes moist, pharynx normal without lesions and very large tongue, thick uvula, small oropharynx, Mallampati 3  Neck - supple, no significant adenopathy, very short and thick neck  Chest - clear to auscultation, no wheezes, rales or rhonchi, symmetric air entry, no tachypnea, retractions or cyanosis bilateral symmetrical chest movement, normal resonance on percussion, air entry is present bilaterally and symmetrical, no expiratory wheezing rhonchi or crackles.   Heart - normal rate, regular rhythm, normal S1, S2, no murmurs, rubs, clicks or gallops  Abdomen - soft, nontender, nondistended, no masses or organomegaly  Neurological - alert, oriented, normal speech, no focal findings or movement disorder noted}  Extremities - peripheral pulses normal, no pedal edema, no clubbing or cyanosis  Skin - normal coloration and turgor, no rashes, no suspicious skin lesions noted       LABS:    CBC:   WBC   Date Value Ref Range Status   03/09/2019 7.8 4.5 - 13.5 k/uL Final   02/10/2017 7.8 4.5 - 13.5 k/uL Final     Hemoglobin   Date Value Ref Range Status   03/09/2019 11.9 (L) 13.0 - 17.0 g/dL Final   02/10/2017 12.6 (L) 13.5 - 17.5 g/dL Final     Platelets   Date Value Ref Range Status   03/09/2019 428 138 - 453 k/uL Final   02/10/2017 354 130 - 400 k/uL Final     BMP:   Sodium   Date Value Ref Range Status   07/01/2021 140 135 - 144 mmol/L Final   05/13/2020 137 135 - 144 mmol/L Final   03/09/2019 138 135 - 144 mmol/L Final     Potassium   Date Value Ref Range Status   07/01/2021 4.3 3.7 - 5.3 mmol/L Final   05/13/2020 4.2 3.7 - 5.3 mmol/L Final   03/09/2019 4.4 3.6 - 4.9 mmol/L Final     Chloride   Date Value Ref Range Status   07/01/2021 103 98 - 107 mmol/L Final   05/13/2020 102 98 - 107 mmol/L Final   03/09/2019 100 98 - 107 mmol/L Final     CO2   Date Value Ref Range Status   07/01/2021 26 20 - 31 mmol/L Final   05/13/2020 24 20 - 31 mmol/L Final   03/09/2019 26 20 - 31 mmol/L Final     BUN   Date Value Ref Range Status   07/01/2021 8 6 - 20 mg/dL Final   05/13/2020 10 6 - 20 mg/dL Final   03/09/2019 8 5 - 18 mg/dL Final     CREATININE   Date Value Ref Range Status   07/01/2021 0.65 (L) 0.70 - 1.20 mg/dL Final   05/13/2020 0.77 0.70 - 1.20 mg/dL Final   03/09/2019 0.78 0.70 - 1.20 mg/dL Final     Glucose   Date Value Ref Range Status   07/01/2021 98 70 - 99 mg/dL Final 03/09/2019 92 60 - 100 mg/dL Final   02/10/2017 92 60 - 100 mg/dL Final     Comment:     Performed at [facility] - INPATIENT [address], [address] [facility]   [phone]       Hepatic:     Amylase: No results found for: AMYLASE  Lipase: No results found for: LIPASE  CARDIAC ENZYMES: No results found for: CKTOTAL, CKMB, CKMBINDEX, TROPONINI  BNP: No results found for: BNP  Lipids:       INR: No results found for: INR  Thyroid:   TSH   Date Value Ref Range Status   07/01/2021 1.11 0.30 - 5.00 mIU/L Final     Urinalysis:     Cultures:-  -----------------------------------------------------------------    ABGs: No results found for: PHART, PO2ART, UWR1YOT    Pulmonary Functions Testing Results:    No results found for: FEV1, FVC, XRU5JMB, TLC, DLCO    CXR      CT Scans    Sleep study 08/30/2020 08/30/2020 and did not show any significant sleep apnea with total AHI of 0.6, sleep efficiency index was 89.7%, sleep latency was 1 minute and REM latency was 2.5 minutes there were 3.'s of REM observed. Sleep study did show PLMD with index of around 32 and he was not taking Xyrem at that time. Assessment and Plan       ICD-10-CM   1. GALO (obstructive sleep apnea)  G47.33       2. Primary narcolepsy without cataplexy  G47.419   3. Morbid obesity with BMI of 45.0-49.9, adult ([facility]  E66.01    Z68.42   4. Mild intermittent asthma without complication  Q75.75   5. Allergic rhinitis, unspecified seasonality, unspecified trigger  J30.9         Plan and recommendation:    I would long discussion with patient again today and discussed the effect of narcolepsy. Regular sleep-wake cycle sleep onset and sleep awakening time, avoidance of long naps during the daytime and recommend naps of 30-minute was during the daytime if needed.   History of noncompliance with Xyrem he was restarted on multiple times in the past but unfortunately he did not take on a regular basis except for few weeks and then he stopped he is observed for some time now he think that he is doing better he is less sleepy more energetic during the daytime he is more active he still sleep for 12 to 14 hours in 24 hours. Discussed with patient about nighttime consolidated sleep. Discussed with him about fixed sleeping time and waking up time. Consolidated nighttime sleep of 8 hours discussed and advised. Avoidance of long nap during the daytime but the naps of 30 minutes advised. Good sleep hygiene and sleep environment discussed with the patient. He is advised not to drive as he is significant sleepiness from narcolepsy still sleep 12 to 14 hours in 24 hours. Wt loss is recommended and discussed  Follow good sleep hygeine instructions  Given sleep hygeine instructions    Singulair 10 mg once daily to continue   Albuterol as needed  He is off Qvar for many years  Advised to use Claritin as needed    Vaccinations recommended annually in fall  Up to date with vaccinations from pulm perspective   Recommend COVID-19 vaccine. Refused and he does not want Covid vaccination  Maintain an active lifestyle   Questions answered to pt's/family's satisfaction  Questions answered pertaining to diagnosis and management explained importance of compliance with therapy       RTC 6 months      It was my pleasure to evaluate Cesar Coto today. Please call with questions. Please note that this chart was generated using voice recognition Dragon dictation software. Although every effort was made to ensure the accuracy of this automated transcription, some errors in transcription may have occurred. Noelle Watts MD, MD             4/27/2022, 2:09 PM    Please note that this chart was generated using voice recognition Dragon dictation software. Although every effort was made to ensure the accuracy of this automated transcription, some errors in transcription may have occurred.

## 2022-09-23 ENCOUNTER — HOSPITAL ENCOUNTER (OUTPATIENT)
Age: 22
Setting detail: SPECIMEN
Discharge: HOME | End: 2022-09-23

## 2022-09-23 ENCOUNTER — OFFICE VISIT (OUTPATIENT)
Dept: INTERNAL MEDICINE | Age: 22
End: 2022-09-23
Payer: COMMERCIAL

## 2022-09-23 VITALS
HEART RATE: 67 BPM | SYSTOLIC BLOOD PRESSURE: 128 MMHG | OXYGEN SATURATION: 97 % | BODY MASS INDEX: 56.33 KG/M2 | DIASTOLIC BLOOD PRESSURE: 69 MMHG | TEMPERATURE: 97.9 F | WEIGHT: 315 LBS

## 2022-09-23 DIAGNOSIS — E66.01 OBESITY, MORBID, BMI 50 OR HIGHER (HCC): ICD-10-CM

## 2022-09-23 DIAGNOSIS — J45.20 MILD INTERMITTENT ASTHMA WITHOUT COMPLICATION: ICD-10-CM

## 2022-09-23 DIAGNOSIS — G47.419 NARCOLEPSY WITHOUT CATAPLEXY: ICD-10-CM

## 2022-09-23 DIAGNOSIS — L30.9 ECZEMA, UNSPECIFIED TYPE: ICD-10-CM

## 2022-09-23 DIAGNOSIS — F32.2 CURRENT SEVERE EPISODE OF MAJOR DEPRESSIVE DISORDER WITHOUT PSYCHOTIC FEATURES, UNSPECIFIED WHETHER RECURRENT (HCC): Primary | ICD-10-CM

## 2022-09-23 DIAGNOSIS — Z23 NEEDS FLU SHOT: ICD-10-CM

## 2022-09-23 DIAGNOSIS — Z23 NEED FOR PROPHYLACTIC VACCINATION AGAINST STREPTOCOCCUS PNEUMONIAE (PNEUMOCOCCUS): ICD-10-CM

## 2022-09-23 DIAGNOSIS — Z11.59 NEED FOR HEPATITIS C SCREENING TEST: ICD-10-CM

## 2022-09-23 DIAGNOSIS — Z11.4 ENCOUNTER FOR SCREENING FOR HIV: ICD-10-CM

## 2022-09-23 PROBLEM — J45.40 MODERATE PERSISTENT ASTHMA, UNCOMPLICATED: Status: RESOLVED | Noted: 2020-06-09 | Resolved: 2022-09-23

## 2022-09-23 PROCEDURE — 99204 OFFICE O/P NEW MOD 45 MIN: CPT

## 2022-09-23 PROCEDURE — 99211 OFF/OP EST MAY X REQ PHY/QHP: CPT | Performed by: STUDENT IN AN ORGANIZED HEALTH CARE EDUCATION/TRAINING PROGRAM

## 2022-09-23 PROCEDURE — 90686 IIV4 VACC NO PRSV 0.5 ML IM: CPT | Performed by: STUDENT IN AN ORGANIZED HEALTH CARE EDUCATION/TRAINING PROGRAM

## 2022-09-23 PROCEDURE — 90677 PCV20 VACCINE IM: CPT | Performed by: STUDENT IN AN ORGANIZED HEALTH CARE EDUCATION/TRAINING PROGRAM

## 2022-09-23 RX ORDER — TRIAMCINOLONE ACETONIDE 1 MG/G
CREAM TOPICAL
Qty: 80 G | Refills: 5 | Status: SHIPPED | OUTPATIENT
Start: 2022-09-23

## 2022-09-23 RX ORDER — CERAMIDE 1,3,6-II/SALICYLIC/B3
CLEANSER (ML) TOPICAL
COMMUNITY
Start: 2022-08-08

## 2022-09-23 RX ORDER — TRIAMCINOLONE ACETONIDE 1 MG/G
CREAM TOPICAL
COMMUNITY
Start: 2022-08-08 | End: 2022-09-23 | Stop reason: SDUPTHER

## 2022-09-23 SDOH — ECONOMIC STABILITY: FOOD INSECURITY: WITHIN THE PAST 12 MONTHS, THE FOOD YOU BOUGHT JUST DIDN'T LAST AND YOU DIDN'T HAVE MONEY TO GET MORE.: NEVER TRUE

## 2022-09-23 SDOH — ECONOMIC STABILITY: FOOD INSECURITY: WITHIN THE PAST 12 MONTHS, YOU WORRIED THAT YOUR FOOD WOULD RUN OUT BEFORE YOU GOT MONEY TO BUY MORE.: NEVER TRUE

## 2022-09-23 ASSESSMENT — PATIENT HEALTH QUESTIONNAIRE - PHQ9
1. LITTLE INTEREST OR PLEASURE IN DOING THINGS: 2
2. FEELING DOWN, DEPRESSED OR HOPELESS: 2
4. FEELING TIRED OR HAVING LITTLE ENERGY: 2
SUM OF ALL RESPONSES TO PHQ QUESTIONS 1-9: 17
SUM OF ALL RESPONSES TO PHQ9 QUESTIONS 1 & 2: 4
SUM OF ALL RESPONSES TO PHQ QUESTIONS 1-9: 17
9. THOUGHTS THAT YOU WOULD BE BETTER OFF DEAD, OR OF HURTING YOURSELF: 0
5. POOR APPETITE OR OVEREATING: 1
10. IF YOU CHECKED OFF ANY PROBLEMS, HOW DIFFICULT HAVE THESE PROBLEMS MADE IT FOR YOU TO DO YOUR WORK, TAKE CARE OF THINGS AT HOME, OR GET ALONG WITH OTHER PEOPLE: 1
6. FEELING BAD ABOUT YOURSELF - OR THAT YOU ARE A FAILURE OR HAVE LET YOURSELF OR YOUR FAMILY DOWN: 2
SUM OF ALL RESPONSES TO PHQ QUESTIONS 1-9: 17
8. MOVING OR SPEAKING SO SLOWLY THAT OTHER PEOPLE COULD HAVE NOTICED. OR THE OPPOSITE, BEING SO FIGETY OR RESTLESS THAT YOU HAVE BEEN MOVING AROUND A LOT MORE THAN USUAL: 2
7. TROUBLE CONCENTRATING ON THINGS, SUCH AS READING THE NEWSPAPER OR WATCHING TELEVISION: 3
3. TROUBLE FALLING OR STAYING ASLEEP: 3
SUM OF ALL RESPONSES TO PHQ QUESTIONS 1-9: 17

## 2022-09-23 ASSESSMENT — SOCIAL DETERMINANTS OF HEALTH (SDOH): HOW HARD IS IT FOR YOU TO PAY FOR THE VERY BASICS LIKE FOOD, HOUSING, MEDICAL CARE, AND HEATING?: NOT HARD AT ALL

## 2022-09-23 NOTE — PROGRESS NOTES
[facility]    New Patient Note/History and Physical    Date of patient's visit: 9/23/2022    Name:  Steve Kelly      YOB: 2000    Patient Care Team:  Alexys Tlelo MD as PCP - General (Internal Medicine)    REASON FOR VISIT: First Visit, establish care     HISTORY OF PRESENTING ILLNESS:    History was obtained from the patient. Steve Kelly is a 22 y.o. is here to establish care. Past medical history significant for Narcolepsy w/out cataplexy, obesity, mild asthma, eczema. Today patient was complaining of eczema on his arms for the past 3 weeks which he reports is getting better with triamcinolone. He  has a history of childhood asthma, it has been well controlled now without his asthma medications, which he states that he is taking infrequently. His PHQ-9 score was 17 today, but is having no active suicidal thoughts. He states that he has lost his grandfather and friends from school. He is also looking for a job. Otherwise patient has a good support system and states that he wants to go to college. He is following with pulmonology for his narcolepsy, which he states that is well controlled. He has been prescribed Xyrem for narcolepsy, but is non-compliant. He also follow up with endocrinology for history of Insulin resistance. PAST MEDICAL AND SURGICAL HISTORY:          Diagnosis Date    Allergic rhinitis     Asthma     Current severe episode of major depressive disorder without psychotic features ([facility] [address] 9/23/2022    GERD (gastroesophageal reflux disease)     Obesity (BMI 30-39.9) 6/9/2020    Unspecified sleep apnea            Procedure Laterality Date    HERNIA REPAIR      SINUS SURGERY  2009    TONSILLECTOMY AND ADENOIDECTOMY  2009    WISDOM TOOTH EXTRACTION  12/14/2018       SOCIAL HISTORY:    TOBACCO:   reports that he has never smoked.  He has never used smokeless tobacco.  ETOH:   reports no history of alcohol use.  DRUGS:  Reports smoking marijuana daily  OCCUPATION:      ALLERGIES:      Allergies   Allergen Reactions    Eggs [Egg White]     Milk-Related Compounds     Other      mold    Penicillins          HOME MEDICATION:      Current Outpatient Medications on File Prior to Visit   Medication Sig Dispense Refill    Emollient (CERAVE MOISTURIZING) CREA apply topically twice a day      albuterol sulfate HFA (PROAIR HFA) 108 (90 Base) MCG/ACT inhaler Inhale 2 puffs into the lungs every 6 hours as needed for Wheezing or Shortness of Breath 1 each 11    montelukast (SINGULAIR) 10 MG tablet take 1 tablet every morning 30 tablet 11     No current facility-administered medications on file prior to visit. FAMILY HISTORY:          Problem Relation Age of Onset    Asthma Other        REVIEW OF SYSTEMS:    General: no significant weight changes. Dermatological: eczema on his both arms with itching  Ears/Nose/Throat: denies any hearing loss, abnormal smelling or throat pain  Respiratory: no cough, pleuritic chest pain, dyspnea, or wheezing  Cardiovascular: no pain, dyspnea on exertion, orthopnea, palpitations, or claudication  Gastrointestinal: no nausea, vomiting, heartburn, diarrhea, constipation, bloating, or abdominal pain. No bloody or black stools. Genito-Urinary: no urinary urgency, frequency, dysuria, nocturia, hesitancy, incontinence, or pain. No hematuria  Musculoskeletal: no arthralgia, myalgia, weakness, or morning stiffness  Neurologic: no TIA or stroke symptoms. no paralysis, or frequent or severe headaches  Hematologic/Lymphatic/Immunologic: no abnormal bleeding/bruising, fever, chills, night sweats orswollen glands.   Endocrine: no heat or cold intolerance and no polyuria        PHYSICAL EXAM:      Vitals:    09/23/22 0858   BP: 128/69   Site: Right Upper Arm   Position: Sitting   Cuff Size: Large Adult   Pulse: 67   Temp: 97.9 °F (36.6 °C)   TempSrc: Infrared   SpO2: 97%   Weight: (!) 349 lb (158.3 kg) General appearance - alert, well appearing, and in no distress, oriented to person, place, and time, and overweight  Mental status - alert, oriented to person, place, and time, normal mood, behavior, speech, dress, motor activity, and thought processes  Eyes - pupils equal and reactive, extraocular eye movements intact  Ears - bilateral TM's and external ear canals normal, right ear normal, left ear normal  Chest - clear to auscultation, no wheezes, rales or rhonchi, symmetric air entry, no tachypnea, retractions or cyanosis  Heart - normal rate, regular rhythm, normal S1, S2, no murmurs, rubs, clicks or gallops  Abdomen - soft, nontender, nondistended, no masses or organomegaly  Neurological - alert, oriented, normal speech, no focal findings or movement disorder noted, motor and sensory grossly normal bilaterally  Extremities - peripheral pulses normal, no pedal edema, no clubbing or cyanosis, no pedal edema noted  Skin: rash on bilateral upper extremities    LABORATORY FINDINGS:    CBC:   Lab Results   Component Value Date/Time    WBC 7.8 03/09/2019 08:21 AM    HGB 11.9 03/09/2019 08:21 AM     03/09/2019 08:21 AM     BMP:    Lab Results   Component Value Date/Time     07/01/2021 09:28 AM    K 4.3 07/01/2021 09:28 AM     07/01/2021 09:28 AM    CO2 26 07/01/2021 09:28 AM    BUN 8 07/01/2021 09:28 AM    CREATININE 0.65 07/01/2021 09:28 AM    GLUCOSE 98 07/01/2021 09:28 AM     Hemoglobin A1C:   Lab Results   Component Value Date/Time    LABA1C 4.6 07/01/2021 09:28 AM     Lipid profile:   Lab Results   Component Value Date/Time    CHOL 160 03/09/2019 08:21 AM    TRIG 92 03/09/2019 08:21 AM    HDL 37 07/01/2021 09:28 AM     Thyroid functions:   Lab Results   Component Value Date/Time    TSH 1.11 07/01/2021 09:28 AM      Hepatic functions:   Lab Results   Component Value Date/Time    ALT 14 07/01/2021 09:28 AM    AST 17 07/01/2021 09:28 AM    PROT 7.2 07/01/2021 09:28 AM    BILITOT 0.62 07/01/2021 09:28 AM    LABALBU 4.0 07/01/2021 09:28 AM     ASSESSMENT AND PLAN:   Juan Migeul Carrillo was seen today for new patient, rash, health maintenance and depression. Diagnoses and all orders for this visit:    Current severe episode of major depressive disorder without psychotic features, unspecified whether recurrent [facility])  -     [address] In Psych ([address] [facility])  -    Patient refuses medical therapy for now, will reassess again during his next appointment    Need for prophylactic vaccination against Streptococcus pneumoniae (pneumococcus)  -     Pneumococcal, PCV20, PREVNAR 21, (age 25 yrs+), IM, PF  -     WA IMMUNIZ ADMIN,1 SINGLE/COMB VAC/TOXOID    Eczema, unspecified type  -     triamcinolone (KENALOG) 0.1 % cream; apply topically twice a day for up to 2 weeks    Mild intermittent asthma without complication    Encounter for screening for HIV  -     HIV Screen; Future    Need for hepatitis C screening test  -     Hepatitis C Antibody; Future    Needs flu shot  -     Influenza, AFLURIA, (age 1 y+), IM, Preservative Free, 0.5 mL  -     WA IMMUNIZ,ADMIN,EACH ADDL    Mild Intermittent asthma   - Well-controlled   -Has not been taking his asthma medications   - Advised to report back if he has worsening of symptoms        INSTRUCTIONS:   Return in about 3 months (around 12/23/2022) for Depression. Juan Miguel Carrillo received counseling on the following healthy behaviors: nutrition, exercise, and medication adherence    Reviewed prior labs and health maintenance. Discussed use, benefit, and side effects of prescribed medications. Barriers to medication compliance addressed. All patient questions answered. Pt voiced understanding. Patient given educational materials - see patient instructions    Sally Alvarenga  PGY-1 Internal Medicine Resident.   [facility].  9/23/2022  10:29 AM

## 2022-11-01 ENCOUNTER — OFFICE VISIT (OUTPATIENT)
Dept: PULMONOLOGY | Age: 22
End: 2022-11-01
Payer: COMMERCIAL

## 2022-11-01 VITALS
DIASTOLIC BLOOD PRESSURE: 75 MMHG | HEIGHT: 66 IN | BODY MASS INDEX: 50.62 KG/M2 | WEIGHT: 315 LBS | HEART RATE: 79 BPM | OXYGEN SATURATION: 97 % | RESPIRATION RATE: 16 BRPM | SYSTOLIC BLOOD PRESSURE: 123 MMHG

## 2022-11-01 DIAGNOSIS — J45.20 MILD INTERMITTENT ASTHMA WITHOUT COMPLICATION: ICD-10-CM

## 2022-11-01 DIAGNOSIS — E66.01 MORBID OBESITY WITH BMI OF 45.0-49.9, ADULT (HCC): ICD-10-CM

## 2022-11-01 DIAGNOSIS — J30.9 ALLERGIC RHINITIS, UNSPECIFIED SEASONALITY, UNSPECIFIED TRIGGER: ICD-10-CM

## 2022-11-01 DIAGNOSIS — G47.419 NARCOLEPSY WITHOUT CATAPLEXY: Primary | ICD-10-CM

## 2022-11-01 PROCEDURE — 1036F TOBACCO NON-USER: CPT | Performed by: INTERNAL MEDICINE

## 2022-11-01 PROCEDURE — G8482 FLU IMMUNIZE ORDER/ADMIN: HCPCS | Performed by: INTERNAL MEDICINE

## 2022-11-01 PROCEDURE — G8417 CALC BMI ABV UP PARAM F/U: HCPCS | Performed by: INTERNAL MEDICINE

## 2022-11-01 PROCEDURE — 99213 OFFICE O/P EST LOW 20 MIN: CPT | Performed by: INTERNAL MEDICINE

## 2022-11-01 PROCEDURE — G8427 DOCREV CUR MEDS BY ELIG CLIN: HCPCS | Performed by: INTERNAL MEDICINE

## 2022-11-01 RX ORDER — MULTIVIT-MIN/IRON/FOLIC ACID/K 18-600-40
CAPSULE ORAL
COMMUNITY

## 2022-11-01 NOTE — LETTER
[facility], [address] [place] [id number]  Phone: [phone]  Fax: [phone]    Sai Dorsey MD        November 1, 2022     Patient: Veronica Field   YOB: 2000   Date of Visit: 11/1/2022       To Whom it May Concern:    Valerie Yuan was seen in my clinic on 11/1/2022. If you have any questions or concerns, please don't hesitate to call.     Sincerely,         Sai Dorsey MD

## 2022-11-01 NOTE — PROGRESS NOTES
OUTPATIENT PULMONARY PROGRESS NOTE      Patient:  Abilio Armstrong  MRN: [id number]    Consulting Physician: Zara Diane MD  Reason for Consult: Narcolepsy without cataplexy/mild intermittent asthma  Primacy Care Physician: Natalya Jackson MD    HISTORY OF PRESENT ILLNESS:   The patient is a 22 y.o. 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 He is working 7 to 8 hours a day. He used to go to sleep around 830 to 9 PM and wake up around 8:52 in the morning. According to patient when he wake up in the morning he feels fresh he feels energetic and ready to go. He take a bus to go to work. He works at [employer] had to be at work at [email or web address] and come back from work at 7 PM.  According to patient he does not take nap during the daytime and at work he does not get sleepy. He take nap for about an hour according to patient on weekends when he does not work. According to mother sometime he take nap when she came back from work. He does not complain of any cataplectic symptoms denies any sleep paralysis and denies sleep hallucination. He denies nocturnal awakening with leg movement denies restless feeling in the legs to interfere at night. He has history of mild intermittent asthma had not taking Qvar for many many years. According to patient he has not taking albuterol for long time either he does have albuterol. He was taking Singulair was supposed to take Singulair but for months he has not taking Singulair currently patient he does not complain of cough wheezing chest tightness or shortness of breath denies any asthma flareup. He denies shortness of breath denies nocturnal awakening with cough wheezing chest tightness. According to patient he is more active he is trying to lose weight and he thinks that he had lost 6 pounds.   He has been followed by endocrinology for insulin resistance. He was also diagnosed with vitamin D deficiency and on vitamin D therapy. For narcolepsy he was on Xyrem in the past but was noncompliant with Xyrem had multiple discussion and written prescription multiple times and he had taken it restarted stop it after few days sometime after few weeks was never compliant and then he stopped using it completely for long time. In the morning since he was seen last time according to patient he has been doing well. He had history of noncompliance with Xyrem in I had multiple discussion with him about the medication he had restarted for a week or 2 and then would stop it after few weeks never was compliant with Xarelto and ultimately stopped using it completely. Last sleep study on 08/30/2020 did not show any significant sleep apnea with total AHI of 0.6, sleep efficiency index was 89.7%, sleep latency was 1 minute and REM latency was 2.5 minutes there were 3.'s of REM observed. Sleep study did show PLMD with index of around 32 and he was not taking Xyrem at that time. Sleep questionnaire on 11/01/2022  Snores at night, Does not wakes himself snoring. Has no witnessed apnea. Does not wakes up with choking and gasping sensation. Positive dry mouth upon awakening. Nagative fatigue and tiredness during the day. No history of sleep apnea. Goes to sleep at 10 30 to 11 pm, wakes up 9-10 am. It takes 10- 15 minutes to fall asleep. Wakes up 0-1 times at night to go to bathroom. Takes no nap during the week days but 1 hour nap on the weekend during the day. No headache in am. Does not drive. Negative forgetfulness or decreased concentration. No nasal congestion or obstruction at night. No significant caffienated drinks or alcohol. No leg jerks during sleep. No restless feelings in legs at night. No numbness or burning in leg or feet. No leg aches or cramps . No loss of muscle strength when angry or laugh.  No hallucination when dozing off or waking up from sleep. No paralysis upon awakening from sleep or going to sleep. No teeth grinding, occasional nightmares, no sleep walking. No night time panic attacks. Initial history and evaluation on 07/31/2010  He has been followed up by Dr. Ann Valladares pediatric pulmonologist for a long time with history of narcolepsy without cataplexy, history of mild intermittent asthma, allergic rhinitis. Accompanied by his mother  Apparently he had narcolepsy for long time according to available history and according to mother he had cataplectic symptoms for short time long time ago and he had not had those symptoms for many many years now, he was on Nuvigil to 50 mg once a day he was still having symptoms with tiredness fatigue excessive daytime sleepiness falling asleep during the daytime multiple times and in last November he was started on Xyrem and supposed to take Xyrem 4.5 g twice at night. He did well since he was started on Xyrem he is more awake during the daytime able to function better but sometimes he does not take Xyrem twice at night and sometimes he forgets. He does feel tired and fatigued during the daytime he does go to sleep easy he take multiple naps during the daytime he dose of easy during the daytime. Since his goals are closed because of pandemic he goes to sleep around 11 PM and wake up 530 to 6:30 in the morning. He take 4 of 5 naps each 30 to 60 minutes during the daytime according to patient he take Xyrem first dose around 8 30-9 and 4 hours later he supposed to take Xyrem which she does not always take it these days. He denies any cataplectic symptoms, in the past many years ago he had sleep paralysis and hallucination but he does not have it for many years anymore. He does have snoring, sometimes witnessed apnea by mother, unrefreshed sleep, multiple awakening at night, decreased concentration during the daytime.   According to mother he had sleep study done in 2013 and at that time he was told that he does not have sleep apnea since then he had gained considerable amount of weight. He had history of mild intermittent asthma he was supposed to be on Qvar in the past he had not taking Qvar for more than a year and does not take albuterol for more than a year he claimed that he get some time weather changes and summer season but he does not have asthma exacerbation or asthma symptoms for some time. He is supposed to take Singulair but he does not take Singulair for some time  He also have history of allergic rhinitis for nasal allergies he was prescribed Zyrtec lately he had not started Zyrtec yet. Sleep Medicine 4/27/2022 10/22/2021 5/21/2021 2/19/2021 11/6/2020 3/7/2018 3/7/2018   Sitting and reading 3 3 3 0 2 1 -   Watching TV 2 1 2 2 2 2 -   Sitting, inactive in a public place (e.g. a theatre or a meeting) 1 1 3 0 3 0 -   As a passenger in a car for an hour without a break 2 2 3 2 3 2 -   Lying down to rest in the afternoon when circumstances permit 2 3 3 0 0 2 -   Sitting and talking to someone 0 1 2 2 2 0 -   Sitting quietly after a lunch without alcohol 1 2 3 2 3 1 -   In a car, while stopped for a few minutes in traffic 1 1 0 0 3 2 -   Hamilton Sleepiness Score 12 14 19 8 18 10 -   Neck circumference (Inches) - - - - - 16 16           Past Medical History:        Diagnosis Date    Allergic rhinitis     Asthma     Current severe episode of major depressive disorder without psychotic features ([facility] [address] 9/23/2022    GERD (gastroesophageal reflux disease)     Obesity (BMI 30-39.9) 6/9/2020    Unspecified sleep apnea        Past Surgical History:        Procedure Laterality Date    HERNIA REPAIR      SINUS SURGERY  2009    TONSILLECTOMY AND ADENOIDECTOMY  2009    WISDOM TOOTH EXTRACTION  12/14/2018       Allergies:     Allergies   Allergen Reactions    Eggs [Egg White]     Milk-Related Compounds     Other      mold    Penicillins          Home Meds:   Outpatient Encounter Medications as of 11/1/2022   Medication Sig Dispense Refill    Cholecalciferol (VITAMIN D) 50 MCG (2000 UT) CAPS capsule Take by mouth      Emollient (CERAVE MOISTURIZING) CREA apply topically twice a day      triamcinolone (KENALOG) 0.1 % cream apply topically twice a day for up to 2 weeks 80 g 5    albuterol sulfate HFA (PROAIR HFA) 108 (90 Base) MCG/ACT inhaler Inhale 2 puffs into the lungs every 6 hours as needed for Wheezing or Shortness of Breath 1 each 11    montelukast (SINGULAIR) 10 MG tablet take 1 tablet every morning 30 tablet 11     No facility-administered encounter medications on file as of 11/1/2022. Social History:   TOBACCO:   reports that he has never smoked. He has never used smokeless tobacco.  ETOH:   reports no history of alcohol use.   OCCUPATION:      Family History:       Problem Relation Age of Onset    Asthma Other        Immunizations:    Immunization History   Administered Date(s) Administered    DTaP 02/05/2002, 05/09/2002, 08/28/2002, 01/02/2003, 09/26/2007    HPV Quadrivalent (Gardasil) 08/01/2013    Hepatitis A Ped/Adol (Havrix, Vaqta) 10/07/2008, 07/31/2012    Hepatitis B Ped/Adol (Engerix-B, Recombivax HB) 2001, 01/03/2002, 08/28/2002    Hib vaccine 02/05/2002, 05/09/2002, 08/28/2002, 01/02/2003    Influenza 01/08/2013    Influenza Nasal 10/16/2014, 11/19/2015    Influenza Whole 10/29/2007, 01/19/2010    Influenza, FLUARIX, FLULAVAL, FLUZONE (age 10 mo+) AND AFLURIA, (age 1 y+), PF, 0.5mL 01/10/2019, 10/31/2019, 09/23/2022    MMR 01/02/2003, 09/26/2007    Meningococcal B, Recombinant Marly Heft) 03/06/2019    Meningococcal MCV4P (Menactra) 08/01/2013    Pneumococcal Conjugate 13-valent (Syekkpy51) 01/02/2003    Pneumococcal conjugate PCV20, PF (Prevnar 20) 09/23/2022    Polio IPV (IPOL) 02/05/2002, 05/09/2002, 08/28/2002, 09/26/2007    Tdap (Boostrix, Adacel) 08/01/2013    Varicella (Varivax) 09/13/2004, 09/26/2007         REVIEW OF SYSTEMS:  CONSTITUTIONAL: Denies fatigue negative for fevers, chills, sweats, and weight loss  EYES:  negative for  double vision, blurred vision, dry eyes, eye discharge, visual disturbance, redness, and icterus  HEENT:  negative for  hearing loss, tinnitus, ear drainage, earaches, nasal congestion, epistaxis, sore throat, hoarseness, voice change, and postnasal drip  RESPIRATORY:  negative for  dry cough, cough with sputum, dyspnea, wheezing, hemoptysis, chest pain, and pleuritic pain  CARDIOVASCULAR:  negative for  chest pain, dyspnea, palpitations, orthopnea, PND, exertional chest pressure/discomfort, fatigue, edema, syncope  GASTROINTESTINAL:  negative for nausea, vomiting, diarrhea, constipation, abdominal pain, abdominal mass, abdominal distention, jaundice, dysphagia, reflux, odynophagia, hematemesis, and hemtochezia  GENITOURINARY:  negative for frequency, dysuria, nocturia, and hematuria  HEMATOLOGIC/LYMPHATIC:  negative for easy bruising, bleeding, lymphadenopathy, and petechiae  ALLERGIC/IMMUNOLOGIC:  negative for recurrent infections, urticaria, hay fever, angioedema, anaphylaxis, and drug reactions  ENDOCRINE:  negative for heat intolerance, cold intolerance, tremor, and weight changes  MUSCULOSKELETAL:  negative for  myalgias, arthralgias, joint swelling, stiff joints, and muscle weakness  NEUROLOGICAL:  negative for headaches, dizziness, seizures, memory problems, speech problems, visual disturbance, gait problems, tremor, dysphagia, weakness, numbness, syncope, and tingling  BEHAVIOR/PSYCH:  negative for decreased sleep, decreased energy level, increased energy level, depressed mood, and anxiety          Physical Exam:    Vitals: /75 (Site: Right Lower Arm, Position: Sitting, Cuff Size: Large Adult)   Pulse 79   Resp 16   Ht 5' 6\" (1.676 m)   Wt (!) 351 lb (159.2 kg)   SpO2 97% Comment: roomn air at rest  BMI 56.65 kg/m²   Last 3 weights:    Wt Readings from Last 3 Encounters:   11/01/22 (!) 351 lb (159.2 kg)   09/23/22 (!) 349 lb (158.3 kg) 04/27/22 (!) 352 lb (159.7 kg)     Body mass index is 56.65 kg/m². Physical Examination:   General appearance - alert, well appearing, and in no distress, overweight and acyanotic, in no respiratory distress  Mental status - alert, oriented to person, place, and time, normal mood, behavior, speech, dress, motor activity, and thought processes  Eyes - pupils equal and reactive, extraocular eye movements intact, sclera anicteric  Ears - right ear normal, left ear normal  Nose - normal and patent, no erythema, discharge or polyps  Mouth - mucous membranes moist, pharynx normal without lesions and very large tongue, thick uvula, small oropharynx, Mallampati 3  Neck - supple, no significant adenopathy, very short and thick neck  Chest - clear to auscultation, no wheezes, rales or rhonchi, symmetric air entry, no tachypnea, retractions or cyanosis bilateral symmetrical chest movement, normal resonance on percussion, air entry is present bilaterally and symmetrical, no expiratory wheezing rhonchi or crackles.   Heart - normal rate, regular rhythm, normal S1, S2, no murmurs, rubs, clicks or gallops  Abdomen - soft, nontender, nondistended, no masses or organomegaly  Neurological - alert, oriented, normal speech, no focal findings or movement disorder noted  Extremities - peripheral pulses normal, no pedal edema, no clubbing or cyanosis  Skin - normal coloration and turgor, no rashes, no suspicious skin lesions noted       LABS:    CBC:   WBC   Date Value Ref Range Status   03/09/2019 7.8 4.5 - 13.5 k/uL Final   02/10/2017 7.8 4.5 - 13.5 k/uL Final     Hemoglobin   Date Value Ref Range Status   03/09/2019 11.9 (L) 13.0 - 17.0 g/dL Final   02/10/2017 12.6 (L) 13.5 - 17.5 g/dL Final     Platelets   Date Value Ref Range Status   03/09/2019 428 138 - 453 k/uL Final   02/10/2017 354 130 - 400 k/uL Final     BMP:   Sodium   Date Value Ref Range Status   07/01/2021 140 135 - 144 mmol/L Final   05/13/2020 137 135 - 144 mmol/L Final   03/09/2019 138 135 - 144 mmol/L Final     Potassium   Date Value Ref Range Status   07/01/2021 4.3 3.7 - 5.3 mmol/L Final   05/13/2020 4.2 3.7 - 5.3 mmol/L Final   03/09/2019 4.4 3.6 - 4.9 mmol/L Final     Chloride   Date Value Ref Range Status   07/01/2021 103 98 - 107 mmol/L Final   05/13/2020 102 98 - 107 mmol/L Final   03/09/2019 100 98 - 107 mmol/L Final     CO2   Date Value Ref Range Status   07/01/2021 26 20 - 31 mmol/L Final   05/13/2020 24 20 - 31 mmol/L Final   03/09/2019 26 20 - 31 mmol/L Final     BUN   Date Value Ref Range Status   07/01/2021 8 6 - 20 mg/dL Final   05/13/2020 10 6 - 20 mg/dL Final   03/09/2019 8 5 - 18 mg/dL Final     Creatinine   Date Value Ref Range Status   07/01/2021 0.65 (L) 0.70 - 1.20 mg/dL Final   05/13/2020 0.77 0.70 - 1.20 mg/dL Final   03/09/2019 0.78 0.70 - 1.20 mg/dL Final     Glucose   Date Value Ref Range Status   07/01/2021 98 70 - 99 mg/dL Final   03/09/2019 92 60 - 100 mg/dL Final   02/10/2017 92 60 - 100 mg/dL Final     Comment:     Performed at [address], [address] [place] [address]   [phone]       Hepatic:     Amylase: No results found for: AMYLASE  Lipase: No results found for: LIPASE  CARDIAC ENZYMES: No results found for: CKTOTAL, CKMB, CKMBINDEX, TROPONINI  BNP: No results found for: BNP  Lipids:       INR: No results found for: INR  Thyroid:   TSH   Date Value Ref Range Status   07/01/2021 1.11 0.30 - 5.00 mIU/L Final     Urinalysis:     Cultures:-  -----------------------------------------------------------------    ABGs: No results found for: PHART, PO2ART, NMP8FBQ    Pulmonary Functions Testing Results:    No results found for: FEV1, FVC, SLA4YBO, TLC, DLCO    CXR      CT Scans    Sleep study 08/30/2020 08/30/2020 and did not show any significant sleep apnea with total AHI of 0.6, sleep efficiency index was 89.7%, sleep latency was 1 minute and REM latency was 2.5 minutes there were 3.'s of REM observed.   Sleep study did show PLMD with index of around 32 and he was not taking Xyrem at that time. Assessment and Plan       ICD-10-CM            1. Primary narcolepsy without cataplexy  G47.419   2. Morbid obesity with BMI of 45.0-49.9, [facility] ([facility]  E66.01    Z68.42   3. Mild intermittent asthma without complication  L73.58   4. Allergic rhinitis, unspecified seasonality, unspecified trigger  J30.9         Plan and recommendation:    I would long discussion with patient today in the presence of mother and discussed the effect of narcolepsy. Regular sleep-wake cycle sleep onset and sleep awakening time, avoidance of long naps during the daytime and recommend naps of 30-minute during the daytime. History of noncompliance with Xyrem he was restarted on multiple times in the past but unfortunately he did not take on a regular basis except for few weeks and then he stopped he is observed for some time now he think that he is doing better he is less sleepy more energetic during the daytime he is more active he still sleep for 12 hours to 13 hours and 24 hours. Discussed with patient about nighttime consolidated sleep. Discussed with him about fixed sleeping time and waking up time. Consolidated nighttime sleep of at least 30 8 hours discussed and advised. Avoidance of long nap during the daytime but the naps of 30 minutes advised. Good sleep hygiene and sleep environment discussed with the patient. He is advised not to drive as he is significant sleepiness from narcolepsy still sleep 12 to 14 hours in 24 hours. Wt loss is recommended and discussed  Follow good sleep hygeine instructions  Given sleep hygeine instructions    Will observe off Singulair  Albuterol as needed  He is off Qvar for many years  Advised to use Claritin as needed    Vaccinations recommended annually in fall for flu. He had flu vaccine this year already  Up to date with vaccinations from pulm perspective   Recommend COVID-19 vaccine.   Refused and he does not want Covid vaccination  Maintain an active lifestyle   Questions answered to pt's/family's satisfaction  Questions answered pertaining to diagnosis and management explained importance of compliance with therapy       RTC 6 months      It was my pleasure to evaluate Veronica Field today. Please call with questions. Please note that this chart was generated using voice recognition Dragon dictation software. Although every effort was made to ensure the accuracy of this automated transcription, some errors in transcription may have occurred. Sai Dorsey MD, MD             11/1/2022, 12:18 PM    Please note that this chart was generated using voice recognition Dragon dictation software. Although every effort was made to ensure the accuracy of this automated transcription, some errors in transcription may have occurred.

## 2023-01-20 ENCOUNTER — TELEPHONE (OUTPATIENT)
Dept: INTERNAL MEDICINE | Age: 23
End: 2023-01-20

## 2023-04-05 ENCOUNTER — NURSE TRIAGE (OUTPATIENT)
Dept: OTHER | Facility: CLINIC | Age: 23
End: 2023-04-05
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
## 2023-05-12 ENCOUNTER — OFFICE VISIT (OUTPATIENT)
Dept: PULMONOLOGY | Age: 23
End: 2023-05-12
Payer: COMMERCIAL

## 2023-05-12 VITALS
RESPIRATION RATE: 16 BRPM | DIASTOLIC BLOOD PRESSURE: 69 MMHG | OXYGEN SATURATION: 99 % | HEART RATE: 76 BPM | WEIGHT: 315 LBS | HEIGHT: 66 IN | TEMPERATURE: 97.7 F | BODY MASS INDEX: 50.62 KG/M2 | SYSTOLIC BLOOD PRESSURE: 123 MMHG

## 2023-05-12 DIAGNOSIS — G47.419 NARCOLEPSY WITHOUT CATAPLEXY: Primary | ICD-10-CM

## 2023-05-12 DIAGNOSIS — J45.20 MILD INTERMITTENT ASTHMA WITHOUT COMPLICATION: ICD-10-CM

## 2023-05-12 DIAGNOSIS — G47.61 PERIODIC LIMB MOVEMENT DISORDER (PLMD): ICD-10-CM

## 2023-05-12 DIAGNOSIS — E66.01 MORBID OBESITY WITH BMI OF 45.0-49.9, ADULT (HCC): ICD-10-CM

## 2023-05-12 DIAGNOSIS — J30.9 ALLERGIC RHINITIS, UNSPECIFIED SEASONALITY, UNSPECIFIED TRIGGER: ICD-10-CM

## 2023-05-12 PROCEDURE — G8427 DOCREV CUR MEDS BY ELIG CLIN: HCPCS | Performed by: INTERNAL MEDICINE

## 2023-05-12 PROCEDURE — G8417 CALC BMI ABV UP PARAM F/U: HCPCS | Performed by: INTERNAL MEDICINE

## 2023-05-12 PROCEDURE — 99214 OFFICE O/P EST MOD 30 MIN: CPT | Performed by: INTERNAL MEDICINE

## 2023-05-12 PROCEDURE — 1036F TOBACCO NON-USER: CPT | Performed by: INTERNAL MEDICINE

## 2023-05-12 RX ORDER — ARMODAFINIL 150 MG/1
150 TABLET ORAL DAILY
Qty: 30 TABLET | Refills: 5 | Status: SHIPPED | OUTPATIENT
Start: 2023-05-12 | End: 2023-06-11

## 2023-05-12 RX ORDER — ERGOCALCIFEROL 1.25 MG/1
CAPSULE ORAL
COMMUNITY
Start: 2023-04-14

## 2023-05-12 RX ORDER — ALBUTEROL SULFATE 90 UG/1
2 AEROSOL, METERED RESPIRATORY (INHALATION) EVERY 6 HOURS PRN
Qty: 1 EACH | Refills: 11 | Status: SHIPPED | OUTPATIENT
Start: 2023-05-12

## 2023-05-12 NOTE — PROGRESS NOTES
OUTPATIENT PULMONARY PROGRESS NOTE      Patient:  Clint Rasheed  MRN: [id number]    Consulting Physician: Julieta Lester MD  Reason for Consult: Narcolepsy without cataplexy/mild intermittent asthma  Primacy Care Physician: Ema Hodges MD    HISTORY OF PRESENT ILLNESS:   The patient is a 25 y.o. male for follow-up of narcolepsy without cataplexy, mild intermittent asthma, hypersomnia obesity and allergic rhinitis    Since he was seen last time according patient he is trying to be more active if. He played basketball not all the time but almost once a week. When he played basketball he Short of breath and use albuterol after that otherwise he does not usually use albuterol he has not been on Qvar for many years and he has been not on Singulair and has been singular. He does not complain of daily or persistent cough he denies any cough except occasionally. He denies a sputum production. He denies nocturnal awakening with cough wheezing chest tightness or shortness of breath. He has history of narcolepsy with long history of noncompliance with Xyrem tried multiple times but he did not continue and had not been on Xyrem for some time. According to patient mother who accompanied him as he does not drive he used to be on methylphenidate in the past but he was followed by pediatric but after some time according to patient he stopped working and he did not like it. He was also on Nuvigil in the past he did not have any problem but it did not work for long time. He does not complain of any symptoms of cataplexy. He had history of sleep paralysis according patient for few years he did not have sleep paralysis and he did not have hallucination. He usually goes to sleep around 1130 and wake up around 10 30-11 a.m. and he take a nap for about an hour in the afternoon. Sleep is refreshing when he wakes up in the morning. He denies nocturnal awakening with gasping or choking or snoring.       For

## 2023-05-17 ENCOUNTER — TELEPHONE (OUTPATIENT)
Dept: PULMONOLOGY | Age: 23
End: 2023-05-17

## 2023-05-17 RX ORDER — ALBUTEROL SULFATE 90 UG/1
2 AEROSOL, METERED RESPIRATORY (INHALATION) 4 TIMES DAILY PRN
Qty: 18 G | Refills: 11 | Status: SHIPPED | OUTPATIENT
Start: 2023-05-17

## 2023-05-17 NOTE — TELEPHONE ENCOUNTER
Received notice from patient insurance. Patient was prescribed Albuterol Sulfate HFA inhaler. His insurance covers name brand Ventolin HFA 90 MCG inhaler. Please sign pending RX for Ventolin if you agree.

## 2023-05-30 ENCOUNTER — OFFICE VISIT (OUTPATIENT)
Dept: DERMATOLOGY | Age: 23
End: 2023-05-30
Payer: COMMERCIAL

## 2023-05-30 VITALS
HEIGHT: 67 IN | SYSTOLIC BLOOD PRESSURE: 108 MMHG | OXYGEN SATURATION: 95 % | WEIGHT: 315 LBS | BODY MASS INDEX: 49.44 KG/M2 | DIASTOLIC BLOOD PRESSURE: 68 MMHG | HEART RATE: 94 BPM

## 2023-05-30 DIAGNOSIS — L20.84 INTRINSIC ATOPIC DERMATITIS: Primary | ICD-10-CM

## 2023-05-30 PROCEDURE — 1036F TOBACCO NON-USER: CPT | Performed by: DERMATOLOGY

## 2023-05-30 PROCEDURE — G8427 DOCREV CUR MEDS BY ELIG CLIN: HCPCS | Performed by: DERMATOLOGY

## 2023-05-30 PROCEDURE — G8417 CALC BMI ABV UP PARAM F/U: HCPCS | Performed by: DERMATOLOGY

## 2023-05-30 PROCEDURE — 99204 OFFICE O/P NEW MOD 45 MIN: CPT | Performed by: DERMATOLOGY

## 2023-05-30 RX ORDER — TACROLIMUS 1 MG/G
OINTMENT TOPICAL
Qty: 60 G | Refills: 2 | Status: SHIPPED | OUTPATIENT
Start: 2023-05-30

## 2023-05-30 RX ORDER — FLUOCINONIDE 0.5 MG/G
OINTMENT TOPICAL
Qty: 60 G | Refills: 2 | Status: SHIPPED | OUTPATIENT
Start: 2023-05-30

## 2023-05-30 NOTE — PROGRESS NOTES
back, abdomen, both legs, buttocks, digits and/or nails, was examined. Genital exam was deferred as patient denied having any lesions in this area. Complete visualization of scalp may be limited by hair density, length, and/or style    Facial covering was removed during examination. Diagnoses/exam findings/medical history pertinent to this visit are listed below:    Assessment:   Diagnosis Orders   1. Intrinsic atopic dermatitis  fluocinonide (LIDEX) 0.05 % ointment    tacrolimus (PROTOPIC) 0.1 % ointment           Plan:  Intrinsic atopic dermatitis, BSA 10%  - chronic illness with progression and/or exacerbation  - start lidex ointment to thick eczema patches twice daily for 2 weeks until clear. Do not apply to face, armpit, or groin. - after eczema has cleared, apply protopic twice daily to eczema-prone areas for maintenance  Counseled on potential side effects of topical corticosteroids including but not limited to skin thinning, and atrophy. Patient instructed to use medication only on affected skin and to stop application once symptoms resolve or until rash clears. - discussed Dupixent in reserve      RTC 3 months    Future Appointments   Date Time Provider [place]   8/30/2023 10:30 AM Tosha Duke MD  derm [facility]   11/21/2023  9:00 AM Alek Peña MD [facility] Spec [email or web address]         Patient Instructions   - start lidex (Fluocinonide) ointment to thick eczema patches twice daily for 2 weeks until clear. Do not apply to face, armpit, or groin. Do not use on lower abdomen for more than 2 weeks. - after eczema has cleared, apply Protopic (Tacrolimus) twice daily to eczema-prone areas for maintenance    If not improving, let me know      I, Anjali Judge, personally scribed the services dictated to me by Dr. Jyoti Hobson in this documentation.      I, Dr. Jyoti Hobson, personally performed the services described in this documentation, as scribed by Anjali Judge in my presence, and it is both accurate

## 2023-05-30 NOTE — PATIENT INSTRUCTIONS
- start lidex (Fluocinonide) ointment to thick eczema patches twice daily for 2 weeks until clear. Do not apply to face, armpit, or groin.    - after eczema has cleared, apply Protopic (Tacrolimus) twice daily to eczema-prone areas for maintenance

## 2023-06-05 ENCOUNTER — TELEPHONE (OUTPATIENT)
Dept: DERMATOLOGY | Age: 23
End: 2023-06-05

## 2023-06-05 NOTE — TELEPHONE ENCOUNTER
Lauren Treviño called the office and stated the creams recently given to him helped clear up the problem area on his arm but it ended up spreading the rash further down.

## 2023-06-15 ENCOUNTER — NURSE ONLY (OUTPATIENT)
Dept: LAB | Age: 23
End: 2023-06-15

## 2023-06-21 ENCOUNTER — NURSE ONLY (OUTPATIENT)
Dept: DERMATOLOGY | Age: 23
End: 2023-06-21

## 2023-06-21 VITALS — WEIGHT: 315 LBS | TEMPERATURE: 97.3 F | BODY MASS INDEX: 50.62 KG/M2 | HEIGHT: 66 IN

## 2023-06-21 NOTE — PROGRESS NOTES
Elizabeth Sims presented for suture removal on the right upper arm. The biopsy site was well healed. The suture was removed and a band-aid applied. The patient left in good condition.

## 2023-08-30 ENCOUNTER — OFFICE VISIT (OUTPATIENT)
Dept: DERMATOLOGY | Age: 23
End: 2023-08-30
Payer: COMMERCIAL

## 2023-08-30 VITALS
SYSTOLIC BLOOD PRESSURE: 126 MMHG | TEMPERATURE: 97.6 F | DIASTOLIC BLOOD PRESSURE: 83 MMHG | HEIGHT: 66 IN | WEIGHT: 313 LBS | HEART RATE: 66 BPM | BODY MASS INDEX: 50.3 KG/M2 | OXYGEN SATURATION: 96 %

## 2023-08-30 DIAGNOSIS — L20.84 INTRINSIC ATOPIC DERMATITIS: Primary | ICD-10-CM

## 2023-08-30 PROCEDURE — 1036F TOBACCO NON-USER: CPT | Performed by: DERMATOLOGY

## 2023-08-30 PROCEDURE — 99214 OFFICE O/P EST MOD 30 MIN: CPT | Performed by: DERMATOLOGY

## 2023-08-30 PROCEDURE — G8417 CALC BMI ABV UP PARAM F/U: HCPCS | Performed by: DERMATOLOGY

## 2023-08-30 PROCEDURE — G8427 DOCREV CUR MEDS BY ELIG CLIN: HCPCS | Performed by: DERMATOLOGY

## 2023-08-30 RX ORDER — CLOBETASOL PROPIONATE 0.5 MG/G
OINTMENT TOPICAL
Qty: 60 G | Refills: 0 | Status: SHIPPED | OUTPATIENT
Start: 2023-08-30

## 2023-08-30 NOTE — PATIENT INSTRUCTIONS
- start clobetasol ointment to affected areas BID for flares, then decrease after skin clears  - Continue Dupixent, no SE

## 2023-08-30 NOTE — PROGRESS NOTES
Dermatology Patient Note  [address]  [address] [place] [address] [address] [address]  Dept: [phone]  Dept Fax: [phone]      VISITDATE: 8/30/2023   REFERRING PROVIDER: No ref. provider found      She Bee is a 25 y.o. male  who presents today in the office for:    Other (Follow up on Atopic Dermatitis. Left arm is not doing well, states it very painful and itchy)      HISTORY OF PRESENT ILLNESS:  As above. 2 month follow up for intrinsic atopic dermatitis. At his last visit, 6/14/23, he received a punch biopsy of the right upper arm which revealed \"subacute/chronic spongiotic dermatitis. \" PA for dupixent placed after path results. His first dose of dupixent was a double dose and he will take another dose tomorrow, no SE other than injection site reaction. He notices significant improvement on eczema and itching ,but still has thick itchy plaques of bilateral arms and buttock. He has sore muscles and joints but states this is due to weather changes as it occurs yearly.     MEDICAL PROBLEMS:  Patient Active Problem List    Diagnosis Date Noted    Eczema 09/23/2022     Priority: Medium    Current severe episode of major depressive disorder without psychotic features ([facility]) 09/23/2022     Priority: Medium    Seasonal allergic rhinitis 06/09/2020    Narcolepsy without cataplexy 06/09/2020    Obesity, morbid, BMI 50 or higher ([address]) 06/09/2020    Acanthosis nigricans 06/09/2020    Awakens from sleep at night     Mild intermittent asthma without complication 26/86/3098    Allergic rhinitis 04/05/2011    Idiopathic hypersomnia 04/05/2011       CURRENT MEDICATIONS:   Current Outpatient Medications   Medication Sig Dispense Refill    fluocinonide (LIDEX) 0.05 % ointment Apply to rash twice daily  for 2 weeks or until clear (not face, armpit or groin) 60 g 2    tacrolimus (PROTOPIC) 0.1 % ointment Apply to eczema and

## 2023-11-21 ENCOUNTER — OFFICE VISIT (OUTPATIENT)
Dept: PULMONOLOGY | Age: 23
End: 2023-11-21
Payer: COMMERCIAL

## 2023-11-21 VITALS
HEIGHT: 66 IN | RESPIRATION RATE: 16 BRPM | DIASTOLIC BLOOD PRESSURE: 79 MMHG | SYSTOLIC BLOOD PRESSURE: 131 MMHG | OXYGEN SATURATION: 97 % | BODY MASS INDEX: 49.82 KG/M2 | WEIGHT: 310 LBS | HEART RATE: 63 BPM

## 2023-11-21 DIAGNOSIS — G47.419 NARCOLEPSY WITHOUT CATAPLEXY: Primary | ICD-10-CM

## 2023-11-21 DIAGNOSIS — G47.61 PERIODIC LIMB MOVEMENT DISORDER (PLMD): ICD-10-CM

## 2023-11-21 DIAGNOSIS — J45.20 MILD INTERMITTENT ASTHMA WITHOUT COMPLICATION: ICD-10-CM

## 2023-11-21 DIAGNOSIS — E66.01 MORBID OBESITY WITH BMI OF 45.0-49.9, ADULT (HCC): ICD-10-CM

## 2023-11-21 DIAGNOSIS — J30.9 ALLERGIC RHINITIS, UNSPECIFIED SEASONALITY, UNSPECIFIED TRIGGER: ICD-10-CM

## 2023-11-21 PROCEDURE — 99214 OFFICE O/P EST MOD 30 MIN: CPT | Performed by: INTERNAL MEDICINE

## 2023-11-21 PROCEDURE — G8417 CALC BMI ABV UP PARAM F/U: HCPCS | Performed by: INTERNAL MEDICINE

## 2023-11-21 PROCEDURE — G8484 FLU IMMUNIZE NO ADMIN: HCPCS | Performed by: INTERNAL MEDICINE

## 2023-11-21 PROCEDURE — G8427 DOCREV CUR MEDS BY ELIG CLIN: HCPCS | Performed by: INTERNAL MEDICINE

## 2023-11-21 PROCEDURE — 1036F TOBACCO NON-USER: CPT | Performed by: INTERNAL MEDICINE

## 2023-11-21 RX ORDER — DUPILUMAB 300 MG/2ML
INJECTION, SOLUTION SUBCUTANEOUS
COMMUNITY
Start: 2023-11-07

## 2023-11-21 NOTE — PROGRESS NOTES
Please call with questions. Please note that this chart was generated using voice recognition Dragon dictation software. Although every effort was made to ensure the accuracy of this automated transcription, some errors in transcription may have occurred. Yenifer Batista MD, MD             11/21/2023, 9:19 AM    Please note that this chart was generated using voice recognition Dragon dictation software. Although every effort was made to ensure the accuracy of this automated transcription, some errors in transcription may have occurred.

## 2023-11-29 ENCOUNTER — OFFICE VISIT (OUTPATIENT)
Dept: DERMATOLOGY | Age: 23
End: 2023-11-29
Payer: COMMERCIAL

## 2023-11-29 VITALS
OXYGEN SATURATION: 98 % | TEMPERATURE: 98.2 F | BODY MASS INDEX: 49.34 KG/M2 | SYSTOLIC BLOOD PRESSURE: 132 MMHG | HEART RATE: 73 BPM | HEIGHT: 66 IN | WEIGHT: 307 LBS | DIASTOLIC BLOOD PRESSURE: 77 MMHG

## 2023-11-29 DIAGNOSIS — L20.84 INTRINSIC ATOPIC DERMATITIS: Primary | ICD-10-CM

## 2023-11-29 PROCEDURE — G8417 CALC BMI ABV UP PARAM F/U: HCPCS | Performed by: DERMATOLOGY

## 2023-11-29 PROCEDURE — G8484 FLU IMMUNIZE NO ADMIN: HCPCS | Performed by: DERMATOLOGY

## 2023-11-29 PROCEDURE — 1036F TOBACCO NON-USER: CPT | Performed by: DERMATOLOGY

## 2023-11-29 PROCEDURE — 99214 OFFICE O/P EST MOD 30 MIN: CPT | Performed by: DERMATOLOGY

## 2023-11-29 PROCEDURE — G8427 DOCREV CUR MEDS BY ELIG CLIN: HCPCS | Performed by: DERMATOLOGY

## 2023-11-29 NOTE — PROGRESS NOTES
[id number] [address] [address] / [address]. All  rights reserved. For more information about DUPIXENT, go to www. [employer]. [employer] or call 4- 272-DUPIXENT (3-550.611.9319). This Patient Information has been approved by the U.S. Food and Drug Administration. Issued: March 2017  [id number]      Robert Colin, personally scribed the services dictated to me by Dr. Beatrice De Dios in this documentation. I, Dr. Beatrice De Dios, personally performed the services described in this documentation, as scribed by Aurora Benito in my presence, and it is both accurate and complete.

## 2024-03-19 NOTE — PROGRESS NOTES
0.05 % ointment Apply to rash twice daily  for 2 weeks or until clear (not face, armpit or groin) 60 g 2    tacrolimus (PROTOPIC) 0.1 % ointment Apply to eczema and eczema-prone areas twice daily 60 g 2    albuterol sulfate HFA (VENTOLIN HFA) 108 (90 Base) MCG/ACT inhaler Inhale 2 puffs into the lungs 4 times daily as needed for Wheezing 18 g 11    vitamin D (ERGOCALCIFEROL) 1.25 MG (27336 UT) CAPS capsule take 1 capsule by mouth MONDAY-THURSDAY (FOUR TIMES A WEEK) FOR 4 WEEKS      albuterol sulfate HFA (PROAIR HFA) 108 (90 Base) MCG/ACT inhaler Inhale 2 puffs into the lungs every 6 hours as needed for Wheezing or Shortness of Breath 1 each 11    Cholecalciferol (VITAMIN D) 50 MCG (2000 UT) CAPS capsule Take by mouth      Emollient (CERAVE MOISTURIZING) CREA apply topically twice a day      triamcinolone (KENALOG) 0.1 % cream apply topically twice a day for up to 2 weeks 80 g 5    montelukast (SINGULAIR) 10 MG tablet take 1 tablet every morning 30 tablet 11     Current Facility-Administered Medications   Medication Dose Route Frequency Provider Last Rate Last Admin    lidocaine-EPINEPHrine 1 %-1:130179 injection 1 mL  1 mL IntraDERmal Once Na Mcgovern MD           ALLERGIES:   Allergies   Allergen Reactions    Eggs [Egg White]     Milk-Related Compounds     Other      mold    Penicillins        SOCIAL HISTORY:  Social History     Tobacco Use    Smoking status: Never    Smokeless tobacco: Never    Tobacco comments:     mom quit in jan   Substance Use Topics    Alcohol use: No     Alcohol/week: 0.0 standard drinks of alcohol       Pertinent ROS:  Review of Systems  Skin: Denies any new changing, growing or bleeding lesions or rashes except as described in the HPI   Constitutional: Denies fevers, chills, and malaise.    PHYSICAL EXAM:   /78   Pulse 59   Temp 98.2 °F (36.8 °C)   Ht 1.676 m (5' 6\")   Wt (!) 142.4 kg (314 lb)   SpO2 98%   BMI 50.68 kg/m²     The patient is generally well appearing, well

## 2024-04-03 ENCOUNTER — OFFICE VISIT (OUTPATIENT)
Dept: DERMATOLOGY | Age: 24
End: 2024-04-03
Payer: COMMERCIAL

## 2024-04-03 VITALS
OXYGEN SATURATION: 98 % | TEMPERATURE: 98.2 F | SYSTOLIC BLOOD PRESSURE: 123 MMHG | BODY MASS INDEX: 50.46 KG/M2 | HEIGHT: 66 IN | WEIGHT: 314 LBS | HEART RATE: 59 BPM | DIASTOLIC BLOOD PRESSURE: 78 MMHG

## 2024-04-03 DIAGNOSIS — L20.84 INTRINSIC ATOPIC DERMATITIS: Primary | ICD-10-CM

## 2024-04-03 PROCEDURE — G8417 CALC BMI ABV UP PARAM F/U: HCPCS | Performed by: DERMATOLOGY

## 2024-04-03 PROCEDURE — 1036F TOBACCO NON-USER: CPT | Performed by: DERMATOLOGY

## 2024-04-03 PROCEDURE — G8427 DOCREV CUR MEDS BY ELIG CLIN: HCPCS | Performed by: DERMATOLOGY

## 2024-04-03 PROCEDURE — 99214 OFFICE O/P EST MOD 30 MIN: CPT | Performed by: DERMATOLOGY

## 2024-04-03 RX ORDER — TACROLIMUS 1 MG/G
OINTMENT TOPICAL
Qty: 60 G | Refills: 2 | Status: SHIPPED | OUTPATIENT
Start: 2024-04-03

## 2024-04-03 RX ORDER — CLOBETASOL PROPIONATE 0.5 MG/G
OINTMENT TOPICAL
Qty: 60 G | Refills: 2 | Status: SHIPPED | OUTPATIENT
Start: 2024-04-03

## 2024-04-09 ENCOUNTER — TELEPHONE (OUTPATIENT)
Dept: DERMATOLOGY | Age: 24
End: 2024-04-09

## 2024-04-09 NOTE — TELEPHONE ENCOUNTER
Pt is on Dupixent and missed doses in March and in April. Pt stated he wants to resume injections. Does he start back with injecting 1 pen every 14 days or 2 pens every 14 days?

## 2024-05-24 ENCOUNTER — OFFICE VISIT (OUTPATIENT)
Dept: PULMONOLOGY | Age: 24
End: 2024-05-24
Payer: COMMERCIAL

## 2024-05-24 VITALS
BODY MASS INDEX: 49.66 KG/M2 | SYSTOLIC BLOOD PRESSURE: 128 MMHG | RESPIRATION RATE: 17 BRPM | OXYGEN SATURATION: 98 % | DIASTOLIC BLOOD PRESSURE: 73 MMHG | HEIGHT: 66 IN | HEART RATE: 84 BPM | WEIGHT: 309 LBS

## 2024-05-24 DIAGNOSIS — J30.9 ALLERGIC RHINITIS, UNSPECIFIED SEASONALITY, UNSPECIFIED TRIGGER: ICD-10-CM

## 2024-05-24 DIAGNOSIS — G47.61 PERIODIC LIMB MOVEMENT DISORDER (PLMD): ICD-10-CM

## 2024-05-24 DIAGNOSIS — G47.419 NARCOLEPSY WITHOUT CATAPLEXY: Primary | ICD-10-CM

## 2024-05-24 DIAGNOSIS — E66.01 MORBID OBESITY WITH BMI OF 45.0-49.9, ADULT (HCC): ICD-10-CM

## 2024-05-24 DIAGNOSIS — J45.20 MILD INTERMITTENT ASTHMA WITHOUT COMPLICATION: ICD-10-CM

## 2024-05-24 PROCEDURE — 99214 OFFICE O/P EST MOD 30 MIN: CPT | Performed by: INTERNAL MEDICINE

## 2024-05-24 PROCEDURE — G8417 CALC BMI ABV UP PARAM F/U: HCPCS | Performed by: INTERNAL MEDICINE

## 2024-05-24 PROCEDURE — G8427 DOCREV CUR MEDS BY ELIG CLIN: HCPCS | Performed by: INTERNAL MEDICINE

## 2024-05-24 PROCEDURE — 1036F TOBACCO NON-USER: CPT | Performed by: INTERNAL MEDICINE

## 2024-05-24 ASSESSMENT — SLEEP AND FATIGUE QUESTIONNAIRES
HOW LIKELY ARE YOU TO NOD OFF OR FALL ASLEEP WHILE SITTING AND READING: WOULD NEVER DOZE
HOW LIKELY ARE YOU TO NOD OFF OR FALL ASLEEP WHILE SITTING INACTIVE IN A PUBLIC PLACE: WOULD NEVER DOZE
HOW LIKELY ARE YOU TO NOD OFF OR FALL ASLEEP WHEN YOU ARE A PASSENGER IN A CAR FOR AN HOUR WITHOUT A BREAK: MODERATE CHANCE OF DOZING
ESS TOTAL SCORE: 9
HOW LIKELY ARE YOU TO NOD OFF OR FALL ASLEEP WHILE SITTING QUIETLY AFTER LUNCH WITHOUT ALCOHOL: HIGH CHANCE OF DOZING
HOW LIKELY ARE YOU TO NOD OFF OR FALL ASLEEP WHILE SITTING AND TALKING TO SOMEONE: WOULD NEVER DOZE
HOW LIKELY ARE YOU TO NOD OFF OR FALL ASLEEP IN A CAR, WHILE STOPPED FOR A FEW MINUTES IN TRAFFIC: SLIGHT CHANCE OF DOZING
HOW LIKELY ARE YOU TO NOD OFF OR FALL ASLEEP WHILE LYING DOWN TO REST IN THE AFTERNOON WHEN CIRCUMSTANCES PERMIT: HIGH CHANCE OF DOZING
HOW LIKELY ARE YOU TO NOD OFF OR FALL ASLEEP WHILE WATCHING TV: WOULD NEVER DOZE

## 2024-06-14 RX ORDER — DUPILUMAB 300 MG/2ML
INJECTION, SOLUTION SUBCUTANEOUS
Qty: 3.92 ML | Refills: 2 | Status: ACTIVE | OUTPATIENT
Start: 2024-06-14

## 2024-08-28 ENCOUNTER — OFFICE VISIT (OUTPATIENT)
Dept: DERMATOLOGY | Age: 24
End: 2024-08-28
Payer: COMMERCIAL

## 2024-08-28 VITALS
WEIGHT: 300 LBS | RESPIRATION RATE: 18 BRPM | BODY MASS INDEX: 48.21 KG/M2 | HEIGHT: 66 IN | OXYGEN SATURATION: 99 % | DIASTOLIC BLOOD PRESSURE: 81 MMHG | SYSTOLIC BLOOD PRESSURE: 129 MMHG | HEART RATE: 80 BPM

## 2024-08-28 DIAGNOSIS — L20.84 INTRINSIC ATOPIC DERMATITIS: Primary | ICD-10-CM

## 2024-08-28 PROCEDURE — G8417 CALC BMI ABV UP PARAM F/U: HCPCS | Performed by: DERMATOLOGY

## 2024-08-28 PROCEDURE — 1036F TOBACCO NON-USER: CPT | Performed by: DERMATOLOGY

## 2024-08-28 PROCEDURE — 99214 OFFICE O/P EST MOD 30 MIN: CPT | Performed by: DERMATOLOGY

## 2024-08-28 PROCEDURE — G8428 CUR MEDS NOT DOCUMENT: HCPCS | Performed by: DERMATOLOGY

## 2024-08-28 NOTE — PROGRESS NOTES
Dermatology Patient Note  [facility] DERMATOLOGY  [address]  [address]  [facility] [address]  Dept: [phone]  Dept Fax: [phone]      VISITDATE: 8/28/2024   REFERRING PROVIDER: No ref. provider found      Harmeet Dawkins is a 23 y.o. male  who presents today in the office for:    Follow-up (4 month follow up Intrinsic atopic dermatitis/Increased pain and discomfort )      HISTORY OF PRESENT ILLNESS:  Patient presents for 4 month eczema follow up. At last visit he was started on clobetasol ointment, protopic ointment, and continued on Dupixent. He was also referred to Dr. Whitman for patch testing.     Today, patient reports that he continues to flare up on his bilateral arms and buttocks near his underwear line. Since flaring, he reports that he has used Vaseline to help heal but that it continues to be inflamed, especially at the elbows. He notes hitting his left elbow which he believes may have worsened the inflammation. He states that he completed allergy testing with Dr. Whitman and received results but can't remember what he is allergic to.    Patient states that he will be following up again with Dr. Whitman on September 21 2024 for further allergy testing.     MEDICAL PROBLEMS:  Patient Active Problem List    Diagnosis Date Noted    Eczema 09/23/2022     Priority: Medium    Current severe episode of major depressive disorder without psychotic features ([facility]) 09/23/2022     Priority: Medium    Seasonal allergic rhinitis 06/09/2020    Narcolepsy without cataplexy 06/09/2020    Obesity, morbid, BMI 50 or higher ([facility]) 06/09/2020    Acanthosis nigricans 06/09/2020    Awakens from sleep at night     Mild intermittent asthma without complication 04/05/2011    Allergic rhinitis 04/05/2011    Idiopathic hypersomnia 04/05/2011       CURRENT MEDICATIONS:   Current Outpatient Medications   Medication Sig Dispense Refill    DUPIXENT 300

## 2024-08-28 NOTE — PATIENT INSTRUCTIONS
- Download the chelsie called \"SkinSAFE\". You are able to put in your allergens and they will provide products that are safe for you to use. You may also scan products to determine if they are safe for you to use them.   - Continue clobetasol ointment to affected areas twice daily   - continue protopic ointment to face   - continue Dupixent; no SE    No

## 2024-09-13 DIAGNOSIS — L20.84 INTRINSIC ATOPIC DERMATITIS: Primary | ICD-10-CM

## 2024-09-13 RX ORDER — DUPILUMAB 300 MG/2ML
INJECTION, SOLUTION SUBCUTANEOUS
Qty: 4 ML | Refills: 5 | Status: ACTIVE | OUTPATIENT
Start: 2024-09-13

## 2025-02-11 ENCOUNTER — OFFICE VISIT (OUTPATIENT)
Age: 25
End: 2025-02-11
Payer: COMMERCIAL

## 2025-02-11 VITALS
HEART RATE: 62 BPM | SYSTOLIC BLOOD PRESSURE: 118 MMHG | BODY MASS INDEX: 46.93 KG/M2 | DIASTOLIC BLOOD PRESSURE: 73 MMHG | WEIGHT: 292 LBS | OXYGEN SATURATION: 97 % | HEIGHT: 66 IN

## 2025-02-11 DIAGNOSIS — L20.84 INTRINSIC ATOPIC DERMATITIS: Primary | ICD-10-CM

## 2025-02-11 PROCEDURE — G8417 CALC BMI ABV UP PARAM F/U: HCPCS | Performed by: DERMATOLOGY

## 2025-02-11 PROCEDURE — 99212 OFFICE O/P EST SF 10 MIN: CPT | Performed by: DERMATOLOGY

## 2025-02-11 PROCEDURE — 4004F PT TOBACCO SCREEN RCVD TLK: CPT | Performed by: DERMATOLOGY

## 2025-02-11 PROCEDURE — G8427 DOCREV CUR MEDS BY ELIG CLIN: HCPCS | Performed by: DERMATOLOGY

## 2025-02-11 PROCEDURE — 99213 OFFICE O/P EST LOW 20 MIN: CPT | Performed by: DERMATOLOGY

## 2025-02-11 NOTE — PROGRESS NOTES
Dermatology Patient Note  [facility]  [facility] DERMATOLOGY  [address] [place] [address]  Dept: [phone]  Dept Fax: [phone]      VISITDATE: 2/11/2025   REFERRING PROVIDER: No ref. provider found      Harmeet Dawkins is a 24 y.o. male  who presents today in the office for:    Other (Patient present for 6mo f/u for AD.  Patient denies any current treatment.  Pt denies any current itching./Last visit 8/28/25./Last dose of Dupixant was Sept 2024./Pt admits he flares in the summer months./)      HISTORY OF PRESENT ILLNESS:  As above. Patient states his skin has nearly completely clear despite not being on dupixent or any topicals. He would like to start Dupixent in the summer because that is when he typically flares. He has thickened skin on buttocks and elbows but denies it bothering him and is not using any creams. He is not actively avoiding allergens    MEDICAL PROBLEMS:  Patient Active Problem List    Diagnosis Date Noted    Eczema 09/23/2022     Priority: Medium    Current severe episode of major depressive disorder without psychotic features ([facility]) 09/23/2022     Priority: Medium    Seasonal allergic rhinitis 06/09/2020    Narcolepsy without cataplexy 06/09/2020    Obesity, morbid, BMI 50 or higher 06/09/2020    Acanthosis nigricans 06/09/2020    Awakens from sleep at night     Mild intermittent asthma without complication 04/05/2011    Allergic rhinitis 04/05/2011    Idiopathic hypersomnia 04/05/2011       CURRENT MEDICATIONS:   Current Outpatient Medications   Medication Sig Dispense Refill    DUPIXENT 300 MG/2ML SOPN injection INJECT 1 PEN (300 MG) SUBCUTANEOUSLY EVERY 2 WEEKS (Patient not taking: Reported on 2/11/2025) 4 mL 5    clobetasol (TEMOVATE) 0.05 % ointment Apply to rash twice daily (not face, armpit or groin) (Patient not taking: Reported on 2/11/2025) 60 g 2    tacrolimus (PROTOPIC) 0.1 % ointment Apply to eczema on face twice daily (Patient